# Patient Record
Sex: MALE | Race: WHITE | NOT HISPANIC OR LATINO | Employment: OTHER | URBAN - NONMETROPOLITAN AREA
[De-identification: names, ages, dates, MRNs, and addresses within clinical notes are randomized per-mention and may not be internally consistent; named-entity substitution may affect disease eponyms.]

---

## 2017-05-03 ENCOUNTER — OFFICE VISIT (OUTPATIENT)
Dept: UROLOGY | Facility: CLINIC | Age: 62
End: 2017-05-03

## 2017-05-03 VITALS
HEIGHT: 71 IN | BODY MASS INDEX: 28.42 KG/M2 | TEMPERATURE: 97.5 F | WEIGHT: 203 LBS | DIASTOLIC BLOOD PRESSURE: 64 MMHG | SYSTOLIC BLOOD PRESSURE: 112 MMHG

## 2017-05-03 DIAGNOSIS — R31.0 GROSS HEMATURIA: Primary | ICD-10-CM

## 2017-05-03 LAB
BILIRUB BLD-MCNC: NEGATIVE MG/DL
CLARITY, POC: CLEAR
COLOR UR: YELLOW
GLUCOSE UR STRIP-MCNC: NEGATIVE MG/DL
KETONES UR QL: NEGATIVE
LEUKOCYTE EST, POC: ABNORMAL
NITRITE UR-MCNC: NEGATIVE MG/ML
PH UR: 6.5 [PH] (ref 5–8)
PROT UR STRIP-MCNC: NEGATIVE MG/DL
RBC # UR STRIP: NEGATIVE /UL
SP GR UR: 1.02 (ref 1–1.03)
UROBILINOGEN UR QL: NORMAL

## 2017-05-03 PROCEDURE — 88112 CYTOPATH CELL ENHANCE TECH: CPT | Performed by: UROLOGY

## 2017-05-03 PROCEDURE — 81003 URINALYSIS AUTO W/O SCOPE: CPT | Performed by: UROLOGY

## 2017-05-03 PROCEDURE — 99204 OFFICE O/P NEW MOD 45 MIN: CPT | Performed by: UROLOGY

## 2017-05-03 RX ORDER — LANOLIN ALCOHOL/MO/W.PET/CERES
400 CREAM (GRAM) TOPICAL DAILY
COMMUNITY

## 2017-05-03 RX ORDER — OMEPRAZOLE 20 MG/1
20 CAPSULE, DELAYED RELEASE ORAL DAILY
COMMUNITY

## 2017-05-03 RX ORDER — ASPIRIN 81 MG/1
81 TABLET ORAL DAILY
COMMUNITY

## 2017-05-03 RX ORDER — CLOPIDOGREL BISULFATE 75 MG/1
75 TABLET ORAL DAILY
COMMUNITY
End: 2019-06-29 | Stop reason: HOSPADM

## 2017-05-03 RX ORDER — RANOLAZINE 500 MG/1
500 TABLET, EXTENDED RELEASE ORAL 2 TIMES DAILY
Status: ON HOLD | COMMUNITY
End: 2020-08-20 | Stop reason: SDUPTHER

## 2017-05-03 RX ORDER — UBIDECARENONE 100 MG
100 CAPSULE ORAL DAILY
COMMUNITY
End: 2019-06-29 | Stop reason: HOSPADM

## 2017-05-03 RX ORDER — UBIDECARENONE 75 MG
50 CAPSULE ORAL DAILY
COMMUNITY

## 2017-05-03 RX ORDER — ROSUVASTATIN CALCIUM 10 MG/1
10 TABLET, COATED ORAL DAILY
COMMUNITY

## 2017-05-08 LAB
CYTO UR: NORMAL
LAB AP CASE REPORT: NORMAL
Lab: NORMAL
PATH REPORT.FINAL DX SPEC: NORMAL
PATH REPORT.GROSS SPEC: NORMAL

## 2017-05-09 ENCOUNTER — HOSPITAL ENCOUNTER (OUTPATIENT)
Dept: CT IMAGING | Facility: HOSPITAL | Age: 62
Discharge: HOME | End: 2017-05-09
Attending: UROLOGY | Admitting: UROLOGY

## 2017-05-09 DIAGNOSIS — R31.0 GROSS HEMATURIA: ICD-10-CM

## 2017-05-09 LAB — CREAT BLDA-MCNC: 1.1 MG/DL (ref 0.6–1.3)

## 2017-05-09 PROCEDURE — 82565 ASSAY OF CREATININE: CPT

## 2017-05-09 PROCEDURE — 0 IOPAMIDOL PER 1 ML: Performed by: UROLOGY

## 2017-05-09 PROCEDURE — 74178 CT ABD&PLV WO CNTR FLWD CNTR: CPT

## 2017-05-09 RX ADMIN — IOPAMIDOL 150 ML: 755 INJECTION, SOLUTION INTRAVENOUS at 09:15

## 2017-05-11 ENCOUNTER — PROCEDURE VISIT (OUTPATIENT)
Dept: UROLOGY | Facility: CLINIC | Age: 62
End: 2017-05-11

## 2017-05-11 DIAGNOSIS — R31.0 GROSS HEMATURIA: Primary | ICD-10-CM

## 2017-05-11 DIAGNOSIS — N20.0 RENAL CALCULUS, LEFT: ICD-10-CM

## 2017-05-11 LAB
BILIRUB BLD-MCNC: NEGATIVE MG/DL
CLARITY, POC: CLEAR
COLOR UR: YELLOW
GLUCOSE UR STRIP-MCNC: NEGATIVE MG/DL
KETONES UR QL: NEGATIVE
LEUKOCYTE EST, POC: NEGATIVE
NITRITE UR-MCNC: NEGATIVE MG/ML
PH UR: 6 [PH] (ref 5–8)
PROT UR STRIP-MCNC: NEGATIVE MG/DL
RBC # UR STRIP: NEGATIVE /UL
SP GR UR: 1.03 (ref 1–1.03)
UROBILINOGEN UR QL: NORMAL

## 2017-05-11 PROCEDURE — 52000 CYSTOURETHROSCOPY: CPT | Performed by: UROLOGY

## 2017-05-11 PROCEDURE — 81003 URINALYSIS AUTO W/O SCOPE: CPT | Performed by: UROLOGY

## 2017-05-15 ENCOUNTER — APPOINTMENT (OUTPATIENT)
Dept: CT IMAGING | Facility: HOSPITAL | Age: 62
End: 2017-05-15
Attending: UROLOGY

## 2017-08-28 ENCOUNTER — HOSPITAL ENCOUNTER (EMERGENCY)
Facility: HOSPITAL | Age: 62
Discharge: HOME | End: 2017-08-29
Attending: EMERGENCY MEDICINE | Admitting: EMERGENCY MEDICINE

## 2017-08-28 ENCOUNTER — TRANSCRIBE ORDERS (OUTPATIENT)
Dept: LAB | Facility: HOSPITAL | Age: 62
End: 2017-08-28

## 2017-08-28 ENCOUNTER — LAB (OUTPATIENT)
Dept: LAB | Facility: HOSPITAL | Age: 62
End: 2017-08-28
Attending: INTERNAL MEDICINE

## 2017-08-28 ENCOUNTER — APPOINTMENT (OUTPATIENT)
Dept: NUCLEAR MEDICINE | Facility: HOSPITAL | Age: 62
End: 2017-08-28

## 2017-08-28 ENCOUNTER — APPOINTMENT (OUTPATIENT)
Dept: LAB | Facility: HOSPITAL | Age: 62
End: 2017-08-28

## 2017-08-28 ENCOUNTER — APPOINTMENT (OUTPATIENT)
Dept: GENERAL RADIOLOGY | Facility: HOSPITAL | Age: 62
End: 2017-08-28

## 2017-08-28 DIAGNOSIS — R77.8 TROPONIN I ABOVE REFERENCE RANGE: ICD-10-CM

## 2017-08-28 DIAGNOSIS — R79.89 OTHER ABNORMAL BLOOD CHEMISTRY: Primary | ICD-10-CM

## 2017-08-28 DIAGNOSIS — R07.9 CHEST PAIN, UNSPECIFIED TYPE: Primary | ICD-10-CM

## 2017-08-28 DIAGNOSIS — R77.8 TROPONIN I ABOVE REFERENCE RANGE: Primary | ICD-10-CM

## 2017-08-28 DIAGNOSIS — R79.89 D-DIMER, ELEVATED: ICD-10-CM

## 2017-08-28 LAB
ALBUMIN SERPL-MCNC: 4.5 G/DL (ref 3.4–4.8)
ALBUMIN/GLOB SERPL: 1.6 G/DL (ref 1.1–1.8)
ALP SERPL-CCNC: 68 U/L (ref 38–126)
ALT SERPL W P-5'-P-CCNC: 37 U/L (ref 21–72)
ANION GAP SERPL CALCULATED.3IONS-SCNC: 12 MMOL/L (ref 5–15)
AST SERPL-CCNC: 26 U/L (ref 17–59)
BASOPHILS # BLD AUTO: 0.04 10*3/MM3 (ref 0–0.2)
BASOPHILS NFR BLD AUTO: 0.7 % (ref 0–2)
BILIRUB SERPL-MCNC: 0.4 MG/DL (ref 0.2–1.3)
BUN BLD-MCNC: 14 MG/DL (ref 7–21)
BUN/CREAT SERPL: 11.9 (ref 7–25)
CALCIUM SPEC-SCNC: 9.2 MG/DL (ref 8.4–10.2)
CHLORIDE SERPL-SCNC: 101 MMOL/L (ref 95–110)
CK MB SERPL-CCNC: 1.07 NG/ML (ref 0–5)
CK SERPL-CCNC: 106 U/L (ref 55–170)
CO2 SERPL-SCNC: 26 MMOL/L (ref 22–31)
CREAT BLD-MCNC: 1.18 MG/DL (ref 0.7–1.3)
D-DIMER, QUANTITATIVE (MAD,POW, STR): 3176 NG/ML (FEU) (ref 0–470)
DEPRECATED RDW RBC AUTO: 40.4 FL (ref 35.1–43.9)
EOSINOPHIL # BLD AUTO: 0.22 10*3/MM3 (ref 0–0.7)
EOSINOPHIL NFR BLD AUTO: 3.6 % (ref 0–7)
ERYTHROCYTE [DISTWIDTH] IN BLOOD BY AUTOMATED COUNT: 12.1 % (ref 11.5–14.5)
GFR SERPL CREATININE-BSD FRML MDRD: 63 ML/MIN/1.73 (ref 49–113)
GLOBULIN UR ELPH-MCNC: 2.8 GM/DL (ref 2.3–3.5)
GLUCOSE BLD-MCNC: 86 MG/DL (ref 60–100)
HCT VFR BLD AUTO: 42.9 % (ref 39–49)
HGB BLD-MCNC: 14.7 G/DL (ref 13.7–17.3)
IMM GRANULOCYTES # BLD: 0.02 10*3/MM3 (ref 0–0.02)
IMM GRANULOCYTES NFR BLD: 0.3 % (ref 0–0.5)
LYMPHOCYTES # BLD AUTO: 1.9 10*3/MM3 (ref 0.6–4.2)
LYMPHOCYTES NFR BLD AUTO: 31.4 % (ref 10–50)
MCH RBC QN AUTO: 32.2 PG (ref 26.5–34)
MCHC RBC AUTO-ENTMCNC: 34.3 G/DL (ref 31.5–36.3)
MCV RBC AUTO: 94.1 FL (ref 80–98)
MONOCYTES # BLD AUTO: 0.69 10*3/MM3 (ref 0–0.9)
MONOCYTES NFR BLD AUTO: 11.4 % (ref 0–12)
NEUTROPHILS # BLD AUTO: 3.18 10*3/MM3 (ref 2–8.6)
NEUTROPHILS NFR BLD AUTO: 52.6 % (ref 37–80)
PLATELET # BLD AUTO: 205 10*3/MM3 (ref 150–450)
PMV BLD AUTO: 10.1 FL (ref 8–12)
POTASSIUM BLD-SCNC: 4 MMOL/L (ref 3.5–5.1)
PROT SERPL-MCNC: 7.3 G/DL (ref 6.3–8.6)
RBC # BLD AUTO: 4.56 10*6/MM3 (ref 4.37–5.74)
SODIUM BLD-SCNC: 139 MMOL/L (ref 137–145)
TROPONIN I SERPL-MCNC: <0.012 NG/ML
WBC NRBC COR # BLD: 6.05 10*3/MM3 (ref 3.2–9.8)

## 2017-08-28 PROCEDURE — 78582 LUNG VENTILAT&PERFUS IMAGING: CPT

## 2017-08-28 PROCEDURE — 84484 ASSAY OF TROPONIN QUANT: CPT

## 2017-08-28 PROCEDURE — 85025 COMPLETE CBC W/AUTO DIFF WBC: CPT

## 2017-08-28 PROCEDURE — A9539 TC99M PENTETATE: HCPCS | Performed by: EMERGENCY MEDICINE

## 2017-08-28 PROCEDURE — A9540 TC99M MAA: HCPCS | Performed by: EMERGENCY MEDICINE

## 2017-08-28 PROCEDURE — 85379 FIBRIN DEGRADATION QUANT: CPT

## 2017-08-28 PROCEDURE — 71020 HC CHEST PA AND LATERAL: CPT

## 2017-08-28 PROCEDURE — 80053 COMPREHEN METABOLIC PANEL: CPT

## 2017-08-28 PROCEDURE — 99283 EMERGENCY DEPT VISIT LOW MDM: CPT

## 2017-08-28 PROCEDURE — 0 TECHNETIUM TC 99M PENTETATE KIT: Performed by: EMERGENCY MEDICINE

## 2017-08-28 PROCEDURE — 82553 CREATINE MB FRACTION: CPT

## 2017-08-28 PROCEDURE — 36415 COLL VENOUS BLD VENIPUNCTURE: CPT

## 2017-08-28 PROCEDURE — 82550 ASSAY OF CK (CPK): CPT

## 2017-08-28 PROCEDURE — 0 TECHNETIUM ALBUMIN AGGREGATED: Performed by: EMERGENCY MEDICINE

## 2017-08-28 RX ADMIN — Medication 1 DOSE: at 23:44

## 2017-08-28 RX ADMIN — KIT FOR THE PREPARATION OF TECHNETIUM TC 99M PENTETATE 1 DOSE: 20 INJECTION, POWDER, LYOPHILIZED, FOR SOLUTION INTRAVENOUS; RESPIRATORY (INHALATION) at 23:23

## 2017-08-29 VITALS
WEIGHT: 192 LBS | TEMPERATURE: 98.8 F | BODY MASS INDEX: 26.88 KG/M2 | RESPIRATION RATE: 18 BRPM | HEART RATE: 60 BPM | DIASTOLIC BLOOD PRESSURE: 73 MMHG | OXYGEN SATURATION: 95 % | HEIGHT: 71 IN | SYSTOLIC BLOOD PRESSURE: 120 MMHG

## 2018-08-28 ENCOUNTER — LAB (OUTPATIENT)
Dept: LAB | Facility: HOSPITAL | Age: 63
End: 2018-08-28

## 2018-08-28 DIAGNOSIS — Z13.21 ENCOUNTER FOR VITAMIN DEFICIENCY SCREENING: Primary | ICD-10-CM

## 2018-08-28 DIAGNOSIS — Z13.21 ENCOUNTER FOR VITAMIN DEFICIENCY SCREENING: ICD-10-CM

## 2018-08-28 LAB — 25(OH)D3 SERPL-MCNC: 42.6 NG/ML (ref 30–100)

## 2018-08-28 PROCEDURE — 36415 COLL VENOUS BLD VENIPUNCTURE: CPT

## 2018-08-28 PROCEDURE — 82306 VITAMIN D 25 HYDROXY: CPT

## 2019-06-27 ENCOUNTER — APPOINTMENT (OUTPATIENT)
Dept: GENERAL RADIOLOGY | Facility: HOSPITAL | Age: 64
End: 2019-06-27

## 2019-06-27 ENCOUNTER — HOSPITAL ENCOUNTER (OUTPATIENT)
Facility: HOSPITAL | Age: 64
Discharge: HOME | End: 2019-06-29
Attending: INTERNAL MEDICINE | Admitting: INTERNAL MEDICINE

## 2019-06-27 ENCOUNTER — APPOINTMENT (OUTPATIENT)
Dept: CARDIOLOGY | Facility: HOSPITAL | Age: 64
End: 2019-06-27

## 2019-06-27 DIAGNOSIS — I25.720 CORONARY ARTERY DISEASE INVOLVING AUTOLOGOUS ARTERY CORONARY BYPASS GRAFT WITH UNSTABLE ANGINA PECTORIS (HCC): Primary | ICD-10-CM

## 2019-06-27 PROBLEM — R07.9 CHEST PAIN: Status: ACTIVE | Noted: 2019-06-27

## 2019-06-27 LAB
ALBUMIN SERPL-MCNC: 3.9 G/DL (ref 3.5–5.2)
ALBUMIN/GLOB SERPL: 1.4 G/DL
ALP SERPL-CCNC: 76 U/L (ref 39–117)
ALT SERPL W P-5'-P-CCNC: 21 U/L (ref 1–41)
ANION GAP SERPL CALCULATED.3IONS-SCNC: 10 MMOL/L (ref 5–15)
AST SERPL-CCNC: 16 U/L (ref 1–40)
BASOPHILS # BLD AUTO: 0.06 10*3/MM3 (ref 0–0.2)
BASOPHILS NFR BLD AUTO: 0.9 % (ref 0–1.5)
BH CV ECHO MEAS - ACS: 1.8 CM
BH CV ECHO MEAS - AO MAX PG (FULL): 2.9 MMHG
BH CV ECHO MEAS - AO MAX PG: 6.9 MMHG
BH CV ECHO MEAS - AO MEAN PG (FULL): 2 MMHG
BH CV ECHO MEAS - AO MEAN PG: 4 MMHG
BH CV ECHO MEAS - AO ROOT AREA (BSA CORRECTED): 1.4
BH CV ECHO MEAS - AO ROOT AREA: 7.1 CM^2
BH CV ECHO MEAS - AO ROOT DIAM: 3 CM
BH CV ECHO MEAS - AO V2 MAX: 131 CM/SEC
BH CV ECHO MEAS - AO V2 MEAN: 94 CM/SEC
BH CV ECHO MEAS - AO V2 VTI: 27 CM
BH CV ECHO MEAS - ASC AORTA: 3 CM
BH CV ECHO MEAS - AVA(I,A): 2.5 CM^2
BH CV ECHO MEAS - AVA(I,D): 2.5 CM^2
BH CV ECHO MEAS - AVA(V,A): 2.4 CM^2
BH CV ECHO MEAS - AVA(V,D): 2.4 CM^2
BH CV ECHO MEAS - BSA(HAYCOCK): 2.1 M^2
BH CV ECHO MEAS - BSA: 2.1 M^2
BH CV ECHO MEAS - BZI_BMI: 30.1 KILOGRAMS/M^2
BH CV ECHO MEAS - BZI_METRIC_HEIGHT: 175.3 CM
BH CV ECHO MEAS - BZI_METRIC_WEIGHT: 92.5 KG
BH CV ECHO MEAS - EDV(CUBED): 96.7 ML
BH CV ECHO MEAS - EDV(TEICH): 96.8 ML
BH CV ECHO MEAS - EF(CUBED): 67.4 %
BH CV ECHO MEAS - EF(TEICH): 59 %
BH CV ECHO MEAS - ESV(CUBED): 31.6 ML
BH CV ECHO MEAS - ESV(TEICH): 39.7 ML
BH CV ECHO MEAS - FS: 31.2 %
BH CV ECHO MEAS - IVS/LVPW: 1.1
BH CV ECHO MEAS - IVSD: 0.81 CM
BH CV ECHO MEAS - LA DIMENSION: 4.6 CM
BH CV ECHO MEAS - LA/AO: 1.5
BH CV ECHO MEAS - LV MASS(C)D: 114 GRAMS
BH CV ECHO MEAS - LV MASS(C)DI: 54.7 GRAMS/M^2
BH CV ECHO MEAS - LV MAX PG: 4 MMHG
BH CV ECHO MEAS - LV MEAN PG: 2 MMHG
BH CV ECHO MEAS - LV V1 MAX: 99.4 CM/SEC
BH CV ECHO MEAS - LV V1 MEAN: 67.1 CM/SEC
BH CV ECHO MEAS - LV V1 VTI: 21.5 CM
BH CV ECHO MEAS - LVIDD: 4.6 CM
BH CV ECHO MEAS - LVIDS: 3.2 CM
BH CV ECHO MEAS - LVOT AREA (M): 3.1 CM^2
BH CV ECHO MEAS - LVOT AREA: 3.1 CM^2
BH CV ECHO MEAS - LVOT DIAM: 2 CM
BH CV ECHO MEAS - LVPWD: 0.76 CM
BH CV ECHO MEAS - MR MAX PG: 39.7 MMHG
BH CV ECHO MEAS - MR MAX VEL: 315 CM/SEC
BH CV ECHO MEAS - MV A MAX VEL: 56.8 CM/SEC
BH CV ECHO MEAS - MV DEC SLOPE: 328 CM/SEC^2
BH CV ECHO MEAS - MV E MAX VEL: 89.2 CM/SEC
BH CV ECHO MEAS - MV E/A: 1.6
BH CV ECHO MEAS - MV P1/2T MAX VEL: 93.6 CM/SEC
BH CV ECHO MEAS - MV P1/2T: 83.6 MSEC
BH CV ECHO MEAS - MVA P1/2T LCG: 2.4 CM^2
BH CV ECHO MEAS - MVA(P1/2T): 2.6 CM^2
BH CV ECHO MEAS - PA MAX PG (FULL): 7.7 MMHG
BH CV ECHO MEAS - PA MAX PG: 9.6 MMHG
BH CV ECHO MEAS - PA V2 MAX: 155 CM/SEC
BH CV ECHO MEAS - PI END-D VEL: 96.4 CM/SEC
BH CV ECHO MEAS - RAP SYSTOLE: 5 MMHG
BH CV ECHO MEAS - RV MAX PG: 1.9 MMHG
BH CV ECHO MEAS - RV MEAN PG: 1 MMHG
BH CV ECHO MEAS - RV V1 MAX: 69.5 CM/SEC
BH CV ECHO MEAS - RV V1 MEAN: 50.1 CM/SEC
BH CV ECHO MEAS - RV V1 VTI: 17.8 CM
BH CV ECHO MEAS - RVDD: 2.8 CM
BH CV ECHO MEAS - RVSP: 37.9 MMHG
BH CV ECHO MEAS - SI(AO): 91.6 ML/M^2
BH CV ECHO MEAS - SI(CUBED): 31.3 ML/M^2
BH CV ECHO MEAS - SI(LVOT): 32.4 ML/M^2
BH CV ECHO MEAS - SI(TEICH): 27.4 ML/M^2
BH CV ECHO MEAS - SV(AO): 190.9 ML
BH CV ECHO MEAS - SV(CUBED): 65.1 ML
BH CV ECHO MEAS - SV(LVOT): 67.5 ML
BH CV ECHO MEAS - SV(TEICH): 57.1 ML
BH CV ECHO MEAS - TR MAX VEL: 287 CM/SEC
BILIRUB SERPL-MCNC: 0.4 MG/DL (ref 0.2–1.2)
BUN BLD-MCNC: 10 MG/DL (ref 8–23)
BUN/CREAT SERPL: 9.2 (ref 7–25)
CALCIUM SPEC-SCNC: 9 MG/DL (ref 8.6–10.5)
CHLORIDE SERPL-SCNC: 100 MMOL/L (ref 98–107)
CHOLEST SERPL-MCNC: 162 MG/DL (ref 0–200)
CO2 SERPL-SCNC: 28 MMOL/L (ref 22–29)
CREAT BLD-MCNC: 1.09 MG/DL (ref 0.76–1.27)
DEPRECATED RDW RBC AUTO: 39.1 FL (ref 37–54)
EOSINOPHIL # BLD AUTO: 0.23 10*3/MM3 (ref 0–0.4)
EOSINOPHIL NFR BLD AUTO: 3.5 % (ref 0.3–6.2)
ERYTHROCYTE [DISTWIDTH] IN BLOOD BY AUTOMATED COUNT: 11.7 % (ref 12.3–15.4)
GFR SERPL CREATININE-BSD FRML MDRD: 68 ML/MIN/1.73
GLOBULIN UR ELPH-MCNC: 2.8 GM/DL
GLUCOSE BLD-MCNC: 97 MG/DL (ref 65–99)
HCT VFR BLD AUTO: 43.5 % (ref 37.5–51)
HDLC SERPL-MCNC: 33 MG/DL (ref 40–60)
HGB BLD-MCNC: 15.2 G/DL (ref 13–17.7)
IMM GRANULOCYTES # BLD AUTO: 0.02 10*3/MM3 (ref 0–0.05)
IMM GRANULOCYTES NFR BLD AUTO: 0.3 % (ref 0–0.5)
INR PPP: 1 (ref 0.8–1.2)
LDLC SERPL CALC-MCNC: 87 MG/DL (ref 0–100)
LDLC/HDLC SERPL: 2.64 {RATIO}
LV EF 2D ECHO EST: 58 %
LYMPHOCYTES # BLD AUTO: 2.16 10*3/MM3 (ref 0.7–3.1)
LYMPHOCYTES NFR BLD AUTO: 32.7 % (ref 19.6–45.3)
MAGNESIUM SERPL-MCNC: 2.2 MG/DL (ref 1.6–2.4)
MCH RBC QN AUTO: 31.9 PG (ref 26.6–33)
MCHC RBC AUTO-ENTMCNC: 34.9 G/DL (ref 31.5–35.7)
MCV RBC AUTO: 91.4 FL (ref 79–97)
MONOCYTES # BLD AUTO: 0.9 10*3/MM3 (ref 0.1–0.9)
MONOCYTES NFR BLD AUTO: 13.6 % (ref 5–12)
NEUTROPHILS # BLD AUTO: 3.24 10*3/MM3 (ref 1.7–7)
NEUTROPHILS NFR BLD AUTO: 49 % (ref 42.7–76)
NRBC BLD AUTO-RTO: 0 /100 WBC (ref 0–0.2)
PLATELET # BLD AUTO: 199 10*3/MM3 (ref 140–450)
PMV BLD AUTO: 9.7 FL (ref 6–12)
POTASSIUM BLD-SCNC: 4.1 MMOL/L (ref 3.5–5.2)
PROT SERPL-MCNC: 6.7 G/DL (ref 6–8.5)
PROTHROMBIN TIME: 13 SECONDS (ref 11.1–15.3)
RBC # BLD AUTO: 4.76 10*6/MM3 (ref 4.14–5.8)
SODIUM BLD-SCNC: 138 MMOL/L (ref 136–145)
TRIGL SERPL-MCNC: 209 MG/DL (ref 0–150)
TROPONIN T SERPL-MCNC: <0.01 NG/ML (ref 0–0.03)
VLDLC SERPL-MCNC: 41.8 MG/DL
WBC NRBC COR # BLD: 6.61 10*3/MM3 (ref 3.4–10.8)

## 2019-06-27 PROCEDURE — 85025 COMPLETE CBC W/AUTO DIFF WBC: CPT | Performed by: INTERNAL MEDICINE

## 2019-06-27 PROCEDURE — G0378 HOSPITAL OBSERVATION PER HR: HCPCS

## 2019-06-27 PROCEDURE — 85610 PROTHROMBIN TIME: CPT | Performed by: INTERNAL MEDICINE

## 2019-06-27 PROCEDURE — 25010000002 ENOXAPARIN PER 10 MG: Performed by: INTERNAL MEDICINE

## 2019-06-27 PROCEDURE — 83735 ASSAY OF MAGNESIUM: CPT | Performed by: INTERNAL MEDICINE

## 2019-06-27 PROCEDURE — 80061 LIPID PANEL: CPT | Performed by: INTERNAL MEDICINE

## 2019-06-27 PROCEDURE — 96372 THER/PROPH/DIAG INJ SC/IM: CPT

## 2019-06-27 PROCEDURE — 93010 ELECTROCARDIOGRAM REPORT: CPT | Performed by: INTERNAL MEDICINE

## 2019-06-27 PROCEDURE — 96361 HYDRATE IV INFUSION ADD-ON: CPT

## 2019-06-27 PROCEDURE — 71046 X-RAY EXAM CHEST 2 VIEWS: CPT

## 2019-06-27 PROCEDURE — 82306 VITAMIN D 25 HYDROXY: CPT | Performed by: INTERNAL MEDICINE

## 2019-06-27 PROCEDURE — 80053 COMPREHEN METABOLIC PANEL: CPT | Performed by: INTERNAL MEDICINE

## 2019-06-27 PROCEDURE — G0379 DIRECT REFER HOSPITAL OBSERV: HCPCS

## 2019-06-27 PROCEDURE — 93306 TTE W/DOPPLER COMPLETE: CPT | Performed by: INTERNAL MEDICINE

## 2019-06-27 PROCEDURE — 63710000001 DIPHENHYDRAMINE PER 50 MG: Performed by: INTERNAL MEDICINE

## 2019-06-27 PROCEDURE — 93005 ELECTROCARDIOGRAM TRACING: CPT | Performed by: INTERNAL MEDICINE

## 2019-06-27 PROCEDURE — 93306 TTE W/DOPPLER COMPLETE: CPT

## 2019-06-27 PROCEDURE — 84484 ASSAY OF TROPONIN QUANT: CPT | Performed by: INTERNAL MEDICINE

## 2019-06-27 PROCEDURE — 63710000001 PREDNISONE PER 5 MG: Performed by: INTERNAL MEDICINE

## 2019-06-27 PROCEDURE — 96360 HYDRATION IV INFUSION INIT: CPT

## 2019-06-27 RX ORDER — LEVOTHYROXINE SODIUM 0.03 MG/1
25 TABLET ORAL DAILY
COMMUNITY

## 2019-06-27 RX ORDER — SODIUM CHLORIDE 0.9 % (FLUSH) 0.9 %
3-10 SYRINGE (ML) INJECTION AS NEEDED
Status: DISCONTINUED | OUTPATIENT
Start: 2019-06-27 | End: 2019-06-29 | Stop reason: HOSPADM

## 2019-06-27 RX ORDER — DIPHENHYDRAMINE HCL 25 MG
25 CAPSULE ORAL ONCE
Status: COMPLETED | OUTPATIENT
Start: 2019-06-27 | End: 2019-06-27

## 2019-06-27 RX ORDER — FOLIC ACID 1 MG/1
500 TABLET ORAL DAILY
Status: DISCONTINUED | OUTPATIENT
Start: 2019-06-28 | End: 2019-06-29 | Stop reason: HOSPADM

## 2019-06-27 RX ORDER — ROSUVASTATIN CALCIUM 10 MG/1
10 TABLET, COATED ORAL NIGHTLY
Status: DISCONTINUED | OUTPATIENT
Start: 2019-06-27 | End: 2019-06-29 | Stop reason: HOSPADM

## 2019-06-27 RX ORDER — ALUMINA, MAGNESIA, AND SIMETHICONE 2400; 2400; 240 MG/30ML; MG/30ML; MG/30ML
15 SUSPENSION ORAL EVERY 6 HOURS PRN
Status: DISCONTINUED | OUTPATIENT
Start: 2019-06-27 | End: 2019-06-29 | Stop reason: HOSPADM

## 2019-06-27 RX ORDER — ASPIRIN 81 MG/1
81 TABLET ORAL DAILY
Status: DISCONTINUED | OUTPATIENT
Start: 2019-06-28 | End: 2019-06-29 | Stop reason: HOSPADM

## 2019-06-27 RX ORDER — PREDNISONE 10 MG/1
60 TABLET ORAL ONCE
Status: COMPLETED | OUTPATIENT
Start: 2019-06-27 | End: 2019-06-27

## 2019-06-27 RX ORDER — RANOLAZINE 500 MG/1
500 TABLET, EXTENDED RELEASE ORAL EVERY 12 HOURS SCHEDULED
Status: DISCONTINUED | OUTPATIENT
Start: 2019-06-27 | End: 2019-06-29 | Stop reason: HOSPADM

## 2019-06-27 RX ORDER — ONDANSETRON 2 MG/ML
4 INJECTION INTRAMUSCULAR; INTRAVENOUS EVERY 6 HOURS PRN
Status: DISCONTINUED | OUTPATIENT
Start: 2019-06-27 | End: 2019-06-29 | Stop reason: HOSPADM

## 2019-06-27 RX ORDER — SODIUM CHLORIDE 9 MG/ML
100 INJECTION, SOLUTION INTRAVENOUS CONTINUOUS
Status: DISCONTINUED | OUTPATIENT
Start: 2019-06-27 | End: 2019-06-28

## 2019-06-27 RX ORDER — CLOPIDOGREL BISULFATE 75 MG/1
75 TABLET ORAL DAILY
Status: DISCONTINUED | OUTPATIENT
Start: 2019-06-28 | End: 2019-06-28 | Stop reason: HOSPADM

## 2019-06-27 RX ORDER — LEVOTHYROXINE SODIUM 0.03 MG/1
25 TABLET ORAL
Status: DISCONTINUED | OUTPATIENT
Start: 2019-06-28 | End: 2019-06-29 | Stop reason: HOSPADM

## 2019-06-27 RX ORDER — ACETAMINOPHEN 325 MG/1
650 TABLET ORAL EVERY 6 HOURS PRN
Status: DISCONTINUED | OUTPATIENT
Start: 2019-06-27 | End: 2019-06-29 | Stop reason: HOSPADM

## 2019-06-27 RX ORDER — SODIUM CHLORIDE 0.9 % (FLUSH) 0.9 %
3 SYRINGE (ML) INJECTION EVERY 12 HOURS SCHEDULED
Status: DISCONTINUED | OUTPATIENT
Start: 2019-06-27 | End: 2019-06-29 | Stop reason: HOSPADM

## 2019-06-27 RX ORDER — UBIDECARENONE 75 MG
50 CAPSULE ORAL DAILY
Status: DISCONTINUED | OUTPATIENT
Start: 2019-06-28 | End: 2019-06-29 | Stop reason: HOSPADM

## 2019-06-27 RX ORDER — BISACODYL 5 MG/1
5 TABLET, DELAYED RELEASE ORAL DAILY PRN
Status: DISCONTINUED | OUTPATIENT
Start: 2019-06-27 | End: 2019-06-29 | Stop reason: HOSPADM

## 2019-06-27 RX ADMIN — METOPROLOL TARTRATE 25 MG: 25 TABLET ORAL at 20:47

## 2019-06-27 RX ADMIN — DIPHENHYDRAMINE HYDROCHLORIDE 25 MG: 25 CAPSULE ORAL at 20:56

## 2019-06-27 RX ADMIN — ROSUVASTATIN CALCIUM 10 MG: 10 TABLET, FILM COATED ORAL at 20:47

## 2019-06-27 RX ADMIN — ENOXAPARIN SODIUM 40 MG: 40 INJECTION SUBCUTANEOUS at 14:23

## 2019-06-27 RX ADMIN — SODIUM CHLORIDE 100 ML/HR: 9 INJECTION, SOLUTION INTRAVENOUS at 12:44

## 2019-06-27 RX ADMIN — RANOLAZINE 500 MG: 500 TABLET, FILM COATED, EXTENDED RELEASE ORAL at 20:46

## 2019-06-27 RX ADMIN — SODIUM CHLORIDE, PRESERVATIVE FREE 3 ML: 5 INJECTION INTRAVENOUS at 12:44

## 2019-06-27 RX ADMIN — SODIUM CHLORIDE 100 ML/HR: 9 INJECTION, SOLUTION INTRAVENOUS at 21:52

## 2019-06-27 RX ADMIN — PREDNISONE 60 MG: 10 TABLET ORAL at 20:51

## 2019-06-28 ENCOUNTER — APPOINTMENT (OUTPATIENT)
Dept: CT IMAGING | Facility: HOSPITAL | Age: 64
End: 2019-06-28

## 2019-06-28 PROBLEM — I25.720 CORONARY ARTERY DISEASE INVOLVING AUTOLOGOUS ARTERY CORONARY BYPASS GRAFT WITH UNSTABLE ANGINA PECTORIS (HCC): Status: ACTIVE | Noted: 2019-06-27

## 2019-06-28 LAB
25(OH)D3 SERPL-MCNC: 23.4 NG/ML (ref 30–100)
ANION GAP SERPL CALCULATED.3IONS-SCNC: 10 MMOL/L (ref 5–15)
BASOPHILS # BLD AUTO: 0.01 10*3/MM3 (ref 0–0.2)
BASOPHILS NFR BLD AUTO: 0.2 % (ref 0–1.5)
BUN BLD-MCNC: 10 MG/DL (ref 8–23)
BUN/CREAT SERPL: 9 (ref 7–25)
CALCIUM SPEC-SCNC: 9 MG/DL (ref 8.6–10.5)
CHLORIDE SERPL-SCNC: 104 MMOL/L (ref 98–107)
CO2 SERPL-SCNC: 25 MMOL/L (ref 22–29)
CREAT BLD-MCNC: 1.11 MG/DL (ref 0.76–1.27)
DEPRECATED RDW RBC AUTO: 39.1 FL (ref 37–54)
EOSINOPHIL # BLD AUTO: 0 10*3/MM3 (ref 0–0.4)
EOSINOPHIL NFR BLD AUTO: 0 % (ref 0.3–6.2)
ERYTHROCYTE [DISTWIDTH] IN BLOOD BY AUTOMATED COUNT: 11.5 % (ref 12.3–15.4)
GFR SERPL CREATININE-BSD FRML MDRD: 67 ML/MIN/1.73
GLUCOSE BLD-MCNC: 157 MG/DL (ref 65–99)
HCT VFR BLD AUTO: 42.4 % (ref 37.5–51)
HCT VFR BLD AUTO: 44 % (ref 37.5–51)
HGB BLD-MCNC: 14.7 G/DL (ref 13–17.7)
HGB BLD-MCNC: 15.4 G/DL (ref 13–17.7)
IMM GRANULOCYTES # BLD AUTO: 0.03 10*3/MM3 (ref 0–0.05)
IMM GRANULOCYTES NFR BLD AUTO: 0.5 % (ref 0–0.5)
INR PPP: 1.01 (ref 0.8–1.2)
LYMPHOCYTES # BLD AUTO: 0.96 10*3/MM3 (ref 0.7–3.1)
LYMPHOCYTES NFR BLD AUTO: 15.3 % (ref 19.6–45.3)
MCH RBC QN AUTO: 32.4 PG (ref 26.6–33)
MCHC RBC AUTO-ENTMCNC: 35 G/DL (ref 31.5–35.7)
MCV RBC AUTO: 92.6 FL (ref 79–97)
MONOCYTES # BLD AUTO: 0.1 10*3/MM3 (ref 0.1–0.9)
MONOCYTES NFR BLD AUTO: 1.6 % (ref 5–12)
NEUTROPHILS # BLD AUTO: 5.19 10*3/MM3 (ref 1.7–7)
NEUTROPHILS NFR BLD AUTO: 82.4 % (ref 42.7–76)
NRBC BLD AUTO-RTO: 0 /100 WBC (ref 0–0.2)
PLATELET # BLD AUTO: 217 10*3/MM3 (ref 140–450)
PMV BLD AUTO: 9.9 FL (ref 6–12)
POTASSIUM BLD-SCNC: 4.4 MMOL/L (ref 3.5–5.2)
PROTHROMBIN TIME: 13.1 SECONDS (ref 11.1–15.3)
RBC # BLD AUTO: 4.75 10*6/MM3 (ref 4.14–5.8)
SODIUM BLD-SCNC: 139 MMOL/L (ref 136–145)
WBC NRBC COR # BLD: 6.29 10*3/MM3 (ref 3.4–10.8)

## 2019-06-28 PROCEDURE — C1757 CATH, THROMBECTOMY/EMBOLECT: HCPCS | Performed by: INTERNAL MEDICINE

## 2019-06-28 PROCEDURE — 85025 COMPLETE CBC W/AUTO DIFF WBC: CPT | Performed by: INTERNAL MEDICINE

## 2019-06-28 PROCEDURE — 80048 BASIC METABOLIC PNL TOTAL CA: CPT | Performed by: INTERNAL MEDICINE

## 2019-06-28 PROCEDURE — C1887 CATHETER, GUIDING: HCPCS | Performed by: INTERNAL MEDICINE

## 2019-06-28 PROCEDURE — G0378 HOSPITAL OBSERVATION PER HR: HCPCS

## 2019-06-28 PROCEDURE — 25010000002 FENTANYL CITRATE (PF) 100 MCG/2ML SOLUTION: Performed by: INTERNAL MEDICINE

## 2019-06-28 PROCEDURE — 93010 ELECTROCARDIOGRAM REPORT: CPT | Performed by: INTERNAL MEDICINE

## 2019-06-28 PROCEDURE — 25010000002 HYDROCORTISONE SODIUM SUCCINATE 100 MG RECONSTITUTED SOLUTION: Performed by: INTERNAL MEDICINE

## 2019-06-28 PROCEDURE — 85018 HEMOGLOBIN: CPT | Performed by: INTERNAL MEDICINE

## 2019-06-28 PROCEDURE — C1760 CLOSURE DEV, VASC: HCPCS | Performed by: INTERNAL MEDICINE

## 2019-06-28 PROCEDURE — C9604 PERC D-E COR REVASC T CABG S: HCPCS | Performed by: INTERNAL MEDICINE

## 2019-06-28 PROCEDURE — C1725 CATH, TRANSLUMIN NON-LASER: HCPCS | Performed by: INTERNAL MEDICINE

## 2019-06-28 PROCEDURE — 85014 HEMATOCRIT: CPT | Performed by: INTERNAL MEDICINE

## 2019-06-28 PROCEDURE — 85610 PROTHROMBIN TIME: CPT | Performed by: INTERNAL MEDICINE

## 2019-06-28 PROCEDURE — 25010000002 EPTIFIBATIDE PER 5 MG: Performed by: INTERNAL MEDICINE

## 2019-06-28 PROCEDURE — 96361 HYDRATE IV INFUSION ADD-ON: CPT

## 2019-06-28 PROCEDURE — C1874 STENT, COATED/COV W/DEL SYS: HCPCS | Performed by: INTERNAL MEDICINE

## 2019-06-28 PROCEDURE — C1769 GUIDE WIRE: HCPCS | Performed by: INTERNAL MEDICINE

## 2019-06-28 PROCEDURE — 25010000002 BIVALIRUDIN TRIFLUOROACETATE 250 MG RECONSTITUTED SOLUTION 1 EACH VIAL: Performed by: INTERNAL MEDICINE

## 2019-06-28 PROCEDURE — 25010000002 EPTIFIBATIDE 20 MG/10ML SOLUTION: Performed by: INTERNAL MEDICINE

## 2019-06-28 PROCEDURE — C1894 INTRO/SHEATH, NON-LASER: HCPCS | Performed by: INTERNAL MEDICINE

## 2019-06-28 PROCEDURE — 0 IOPAMIDOL PER 1 ML: Performed by: INTERNAL MEDICINE

## 2019-06-28 PROCEDURE — 93455 CORONARY ART/GRFT ANGIO S&I: CPT | Performed by: INTERNAL MEDICINE

## 2019-06-28 PROCEDURE — 25010000002 MIDAZOLAM PER 1 MG: Performed by: INTERNAL MEDICINE

## 2019-06-28 PROCEDURE — 25010000002 DIPHENHYDRAMINE PER 50 MG: Performed by: INTERNAL MEDICINE

## 2019-06-28 PROCEDURE — 93005 ELECTROCARDIOGRAM TRACING: CPT | Performed by: INTERNAL MEDICINE

## 2019-06-28 DEVICE — XIENCE SIERRA™ EVEROLIMUS ELUTING CORONARY STENT SYSTEM 2.25 MM X 12 MM / RAPID-EXCHANGE
Type: IMPLANTABLE DEVICE | Status: FUNCTIONAL
Brand: XIENCE SIERRA™

## 2019-06-28 DEVICE — XIENCE SIERRA™ EVEROLIMUS ELUTING CORONARY STENT SYSTEM 2.75 MM X 38 MM / RAPID-EXCHANGE
Type: IMPLANTABLE DEVICE | Status: FUNCTIONAL
Brand: XIENCE SIERRA™

## 2019-06-28 DEVICE — XIENCE SIERRA™ EVEROLIMUS ELUTING CORONARY STENT SYSTEM 2.75 MM X 33 MM / RAPID-EXCHANGE
Type: IMPLANTABLE DEVICE | Status: FUNCTIONAL
Brand: XIENCE SIERRA™

## 2019-06-28 RX ORDER — SODIUM CHLORIDE 0.9 % (FLUSH) 0.9 %
3 SYRINGE (ML) INJECTION EVERY 12 HOURS SCHEDULED
Status: DISCONTINUED | OUTPATIENT
Start: 2019-06-28 | End: 2019-06-28 | Stop reason: HOSPADM

## 2019-06-28 RX ORDER — EPTIFIBATIDE 0.75 MG/ML
2 INJECTION, SOLUTION INTRAVENOUS CONTINUOUS
Status: DISCONTINUED | OUTPATIENT
Start: 2019-06-28 | End: 2019-06-29 | Stop reason: HOSPADM

## 2019-06-28 RX ORDER — SODIUM CHLORIDE 0.9 % (FLUSH) 0.9 %
1-10 SYRINGE (ML) INJECTION AS NEEDED
Status: DISCONTINUED | OUTPATIENT
Start: 2019-06-28 | End: 2019-06-28 | Stop reason: HOSPADM

## 2019-06-28 RX ORDER — NITROGLYCERIN 5 MG/ML
INJECTION, SOLUTION INTRAVENOUS AS NEEDED
Status: DISCONTINUED | OUTPATIENT
Start: 2019-06-28 | End: 2019-06-28 | Stop reason: HOSPADM

## 2019-06-28 RX ORDER — EPTIFIBATIDE 0.75 MG/ML
INJECTION, SOLUTION INTRAVENOUS CONTINUOUS PRN
Status: COMPLETED | OUTPATIENT
Start: 2019-06-28 | End: 2019-06-28

## 2019-06-28 RX ORDER — DIPHENHYDRAMINE HYDROCHLORIDE 50 MG/ML
25 INJECTION INTRAMUSCULAR; INTRAVENOUS ONCE
Status: COMPLETED | OUTPATIENT
Start: 2019-06-28 | End: 2019-06-28

## 2019-06-28 RX ORDER — LIDOCAINE HYDROCHLORIDE 20 MG/ML
INJECTION, SOLUTION INFILTRATION; PERINEURAL AS NEEDED
Status: DISCONTINUED | OUTPATIENT
Start: 2019-06-28 | End: 2019-06-28 | Stop reason: HOSPADM

## 2019-06-28 RX ORDER — SODIUM CHLORIDE 9 MG/ML
1-3 INJECTION, SOLUTION INTRAVENOUS CONTINUOUS
Status: DISCONTINUED | OUTPATIENT
Start: 2019-06-28 | End: 2019-06-28

## 2019-06-28 RX ORDER — EPTIFIBATIDE 2 MG/ML
INJECTION, SOLUTION INTRAVENOUS AS NEEDED
Status: DISCONTINUED | OUTPATIENT
Start: 2019-06-28 | End: 2019-06-28 | Stop reason: HOSPADM

## 2019-06-28 RX ORDER — FENTANYL CITRATE 50 UG/ML
INJECTION, SOLUTION INTRAMUSCULAR; INTRAVENOUS AS NEEDED
Status: DISCONTINUED | OUTPATIENT
Start: 2019-06-28 | End: 2019-06-28 | Stop reason: HOSPADM

## 2019-06-28 RX ORDER — SODIUM CHLORIDE 9 MG/ML
100 INJECTION, SOLUTION INTRAVENOUS CONTINUOUS
Status: DISCONTINUED | OUTPATIENT
Start: 2019-06-28 | End: 2019-06-29 | Stop reason: HOSPADM

## 2019-06-28 RX ORDER — MIDAZOLAM HYDROCHLORIDE 1 MG/ML
INJECTION INTRAMUSCULAR; INTRAVENOUS AS NEEDED
Status: DISCONTINUED | OUTPATIENT
Start: 2019-06-28 | End: 2019-06-28 | Stop reason: HOSPADM

## 2019-06-28 RX ADMIN — ROSUVASTATIN CALCIUM 10 MG: 10 TABLET, FILM COATED ORAL at 20:38

## 2019-06-28 RX ADMIN — METOPROLOL TARTRATE 25 MG: 25 TABLET ORAL at 16:33

## 2019-06-28 RX ADMIN — TICAGRELOR 90 MG: 90 TABLET ORAL at 20:39

## 2019-06-28 RX ADMIN — HYDROCORTISONE SODIUM SUCCINATE 100 MG: 100 INJECTION, POWDER, FOR SOLUTION INTRAMUSCULAR; INTRAVENOUS at 09:49

## 2019-06-28 RX ADMIN — METOPROLOL TARTRATE 25 MG: 25 TABLET ORAL at 20:38

## 2019-06-28 RX ADMIN — Medication 3 ML: at 16:35

## 2019-06-28 RX ADMIN — SODIUM CHLORIDE 3 ML/KG/HR: 9 INJECTION, SOLUTION INTRAVENOUS at 07:14

## 2019-06-28 RX ADMIN — RANOLAZINE 500 MG: 500 TABLET, FILM COATED, EXTENDED RELEASE ORAL at 20:38

## 2019-06-28 RX ADMIN — Medication 500 MCG: at 16:34

## 2019-06-28 RX ADMIN — DIPHENHYDRAMINE HYDROCHLORIDE 25 MG: 50 INJECTION INTRAMUSCULAR; INTRAVENOUS at 09:48

## 2019-06-28 RX ADMIN — VITAM B12 50 MCG: 100 TAB at 16:33

## 2019-06-28 RX ADMIN — RANOLAZINE 500 MG: 500 TABLET, FILM COATED, EXTENDED RELEASE ORAL at 16:33

## 2019-06-28 RX ADMIN — SODIUM CHLORIDE 100 ML/HR: 9 INJECTION, SOLUTION INTRAVENOUS at 23:05

## 2019-06-29 ENCOUNTER — APPOINTMENT (OUTPATIENT)
Dept: ULTRASOUND IMAGING | Facility: HOSPITAL | Age: 64
End: 2019-06-29

## 2019-06-29 VITALS
BODY MASS INDEX: 30.24 KG/M2 | HEIGHT: 69 IN | OXYGEN SATURATION: 98 % | HEART RATE: 68 BPM | RESPIRATION RATE: 18 BRPM | DIASTOLIC BLOOD PRESSURE: 70 MMHG | TEMPERATURE: 98 F | WEIGHT: 204.19 LBS | SYSTOLIC BLOOD PRESSURE: 128 MMHG

## 2019-06-29 LAB
ANION GAP SERPL CALCULATED.3IONS-SCNC: 11 MMOL/L (ref 5–15)
BASOPHILS # BLD AUTO: 0.05 10*3/MM3 (ref 0–0.2)
BASOPHILS NFR BLD AUTO: 0.4 % (ref 0–1.5)
BUN BLD-MCNC: 7 MG/DL (ref 8–23)
BUN/CREAT SERPL: 7.5 (ref 7–25)
CALCIUM SPEC-SCNC: 8.4 MG/DL (ref 8.6–10.5)
CHLORIDE SERPL-SCNC: 103 MMOL/L (ref 98–107)
CO2 SERPL-SCNC: 26 MMOL/L (ref 22–29)
CREAT BLD-MCNC: 0.93 MG/DL (ref 0.76–1.27)
DEPRECATED RDW RBC AUTO: 40.8 FL (ref 37–54)
EOSINOPHIL # BLD AUTO: 0.02 10*3/MM3 (ref 0–0.4)
EOSINOPHIL NFR BLD AUTO: 0.2 % (ref 0.3–6.2)
ERYTHROCYTE [DISTWIDTH] IN BLOOD BY AUTOMATED COUNT: 11.9 % (ref 12.3–15.4)
GFR SERPL CREATININE-BSD FRML MDRD: 82 ML/MIN/1.73
GLUCOSE BLD-MCNC: 129 MG/DL (ref 65–99)
HCT VFR BLD AUTO: 42.1 % (ref 37.5–51)
HGB BLD-MCNC: 14.4 G/DL (ref 13–17.7)
IMM GRANULOCYTES # BLD AUTO: 0.05 10*3/MM3 (ref 0–0.05)
IMM GRANULOCYTES NFR BLD AUTO: 0.4 % (ref 0–0.5)
LYMPHOCYTES # BLD AUTO: 1.89 10*3/MM3 (ref 0.7–3.1)
LYMPHOCYTES NFR BLD AUTO: 16.7 % (ref 19.6–45.3)
MCH RBC QN AUTO: 31.9 PG (ref 26.6–33)
MCHC RBC AUTO-ENTMCNC: 34.2 G/DL (ref 31.5–35.7)
MCV RBC AUTO: 93.3 FL (ref 79–97)
MONOCYTES # BLD AUTO: 1.18 10*3/MM3 (ref 0.1–0.9)
MONOCYTES NFR BLD AUTO: 10.4 % (ref 5–12)
NEUTROPHILS # BLD AUTO: 8.11 10*3/MM3 (ref 1.7–7)
NEUTROPHILS NFR BLD AUTO: 71.9 % (ref 42.7–76)
NRBC BLD AUTO-RTO: 0 /100 WBC (ref 0–0.2)
PLATELET # BLD AUTO: 186 10*3/MM3 (ref 140–450)
PMV BLD AUTO: 10.3 FL (ref 6–12)
POTASSIUM BLD-SCNC: 3.6 MMOL/L (ref 3.5–5.2)
RBC # BLD AUTO: 4.51 10*6/MM3 (ref 4.14–5.8)
SODIUM BLD-SCNC: 140 MMOL/L (ref 136–145)
WBC NRBC COR # BLD: 11.3 10*3/MM3 (ref 3.4–10.8)

## 2019-06-29 PROCEDURE — G0378 HOSPITAL OBSERVATION PER HR: HCPCS

## 2019-06-29 PROCEDURE — 93926 LOWER EXTREMITY STUDY: CPT

## 2019-06-29 PROCEDURE — 85025 COMPLETE CBC W/AUTO DIFF WBC: CPT | Performed by: INTERNAL MEDICINE

## 2019-06-29 PROCEDURE — 80048 BASIC METABOLIC PNL TOTAL CA: CPT | Performed by: INTERNAL MEDICINE

## 2019-06-29 RX ADMIN — TICAGRELOR 90 MG: 90 TABLET ORAL at 08:08

## 2019-06-29 RX ADMIN — METOPROLOL TARTRATE 25 MG: 25 TABLET ORAL at 08:11

## 2019-06-29 RX ADMIN — VITAM B12 50 MCG: 100 TAB at 08:08

## 2019-06-29 RX ADMIN — RANOLAZINE 500 MG: 500 TABLET, FILM COATED, EXTENDED RELEASE ORAL at 08:08

## 2019-06-29 RX ADMIN — Medication 500 MCG: at 08:08

## 2019-06-29 RX ADMIN — LEVOTHYROXINE SODIUM 25 MCG: 25 TABLET ORAL at 04:49

## 2019-06-29 RX ADMIN — SODIUM CHLORIDE, PRESERVATIVE FREE 3 ML: 5 INJECTION INTRAVENOUS at 08:09

## 2019-06-29 RX ADMIN — ASPIRIN 81 MG: 81 TABLET, COATED ORAL at 08:08

## 2019-08-08 ENCOUNTER — PREP FOR SURGERY (OUTPATIENT)
Dept: OTHER | Facility: HOSPITAL | Age: 64
End: 2019-08-08

## 2019-08-08 DIAGNOSIS — R07.9 CHEST PAIN, UNSPECIFIED TYPE: Primary | ICD-10-CM

## 2019-08-08 DIAGNOSIS — I25.710 ATHEROSCLEROSIS OF AUTOLOGOUS VEIN CORONARY ARTERY BYPASS GRAFT WITH UNSTABLE ANGINA PECTORIS (HCC): ICD-10-CM

## 2019-08-08 DIAGNOSIS — I25.110 ATHEROSCLEROSIS OF NATIVE CORONARY ARTERY OF NATIVE HEART WITH UNSTABLE ANGINA PECTORIS (HCC): ICD-10-CM

## 2019-08-08 RX ORDER — SODIUM CHLORIDE 0.9 % (FLUSH) 0.9 %
3 SYRINGE (ML) INJECTION EVERY 12 HOURS SCHEDULED
Status: CANCELLED | OUTPATIENT
Start: 2019-08-12

## 2019-08-08 RX ORDER — SODIUM CHLORIDE 0.9 % (FLUSH) 0.9 %
1-10 SYRINGE (ML) INJECTION AS NEEDED
Status: CANCELLED | OUTPATIENT
Start: 2019-08-12

## 2019-08-08 RX ORDER — SODIUM CHLORIDE 9 MG/ML
75 INJECTION, SOLUTION INTRAVENOUS CONTINUOUS
Status: CANCELLED | OUTPATIENT
Start: 2019-08-12

## 2019-08-12 ENCOUNTER — HOSPITAL ENCOUNTER (OUTPATIENT)
Facility: HOSPITAL | Age: 64
Discharge: HOME | End: 2019-08-13
Attending: INTERNAL MEDICINE | Admitting: INTERNAL MEDICINE

## 2019-08-12 DIAGNOSIS — I25.710 ATHEROSCLEROSIS OF AUTOLOGOUS VEIN CORONARY ARTERY BYPASS GRAFT WITH UNSTABLE ANGINA PECTORIS (HCC): ICD-10-CM

## 2019-08-12 DIAGNOSIS — I25.110 ATHEROSCLEROSIS OF NATIVE CORONARY ARTERY OF NATIVE HEART WITH UNSTABLE ANGINA PECTORIS (HCC): ICD-10-CM

## 2019-08-12 DIAGNOSIS — R07.9 CHEST PAIN, UNSPECIFIED TYPE: ICD-10-CM

## 2019-08-12 LAB
ANION GAP SERPL CALCULATED.3IONS-SCNC: 11 MMOL/L (ref 5–15)
BASOPHILS # BLD AUTO: 0.06 10*3/MM3 (ref 0–0.2)
BASOPHILS NFR BLD AUTO: 1 % (ref 0–1.5)
BUN BLD-MCNC: 7 MG/DL (ref 8–23)
BUN/CREAT SERPL: 7.3 (ref 7–25)
CALCIUM SPEC-SCNC: 9.1 MG/DL (ref 8.6–10.5)
CHLORIDE SERPL-SCNC: 104 MMOL/L (ref 98–107)
CHOLEST SERPL-MCNC: 140 MG/DL (ref 0–200)
CO2 SERPL-SCNC: 25 MMOL/L (ref 22–29)
CREAT BLD-MCNC: 0.96 MG/DL (ref 0.76–1.27)
DEPRECATED RDW RBC AUTO: 39.9 FL (ref 37–54)
EOSINOPHIL # BLD AUTO: 0.26 10*3/MM3 (ref 0–0.4)
EOSINOPHIL NFR BLD AUTO: 4.2 % (ref 0.3–6.2)
ERYTHROCYTE [DISTWIDTH] IN BLOOD BY AUTOMATED COUNT: 12 % (ref 12.3–15.4)
GFR SERPL CREATININE-BSD FRML MDRD: 79 ML/MIN/1.73
GLUCOSE BLD-MCNC: 109 MG/DL (ref 65–99)
HCT VFR BLD AUTO: 43.5 % (ref 37.5–51)
HDLC SERPL-MCNC: 36 MG/DL (ref 40–60)
HGB BLD-MCNC: 14.9 G/DL (ref 13–17.7)
IMM GRANULOCYTES # BLD AUTO: 0.03 10*3/MM3 (ref 0–0.05)
IMM GRANULOCYTES NFR BLD AUTO: 0.5 % (ref 0–0.5)
INR PPP: 0.97 (ref 0.8–1.2)
LDLC SERPL CALC-MCNC: 80 MG/DL (ref 0–100)
LDLC/HDLC SERPL: 2.22 {RATIO}
LYMPHOCYTES # BLD AUTO: 1.79 10*3/MM3 (ref 0.7–3.1)
LYMPHOCYTES NFR BLD AUTO: 28.7 % (ref 19.6–45.3)
MCH RBC QN AUTO: 31.4 PG (ref 26.6–33)
MCHC RBC AUTO-ENTMCNC: 34.3 G/DL (ref 31.5–35.7)
MCV RBC AUTO: 91.6 FL (ref 79–97)
MONOCYTES # BLD AUTO: 0.74 10*3/MM3 (ref 0.1–0.9)
MONOCYTES NFR BLD AUTO: 11.9 % (ref 5–12)
NEUTROPHILS # BLD AUTO: 3.35 10*3/MM3 (ref 1.7–7)
NEUTROPHILS NFR BLD AUTO: 53.7 % (ref 42.7–76)
NRBC BLD AUTO-RTO: 0 /100 WBC (ref 0–0.2)
PLATELET # BLD AUTO: 222 10*3/MM3 (ref 140–450)
PMV BLD AUTO: 9.9 FL (ref 6–12)
POTASSIUM BLD-SCNC: 4.2 MMOL/L (ref 3.5–5.2)
PROTHROMBIN TIME: 12.7 SECONDS (ref 11.1–15.3)
RBC # BLD AUTO: 4.75 10*6/MM3 (ref 4.14–5.8)
SODIUM BLD-SCNC: 140 MMOL/L (ref 136–145)
TRIGL SERPL-MCNC: 121 MG/DL (ref 0–150)
VLDLC SERPL-MCNC: 24.2 MG/DL
WBC NRBC COR # BLD: 6.23 10*3/MM3 (ref 3.4–10.8)

## 2019-08-12 PROCEDURE — C1894 INTRO/SHEATH, NON-LASER: HCPCS | Performed by: INTERNAL MEDICINE

## 2019-08-12 PROCEDURE — 93455 CORONARY ART/GRFT ANGIO S&I: CPT | Performed by: INTERNAL MEDICINE

## 2019-08-12 PROCEDURE — C1887 CATHETER, GUIDING: HCPCS | Performed by: INTERNAL MEDICINE

## 2019-08-12 PROCEDURE — C1725 CATH, TRANSLUMIN NON-LASER: HCPCS | Performed by: INTERNAL MEDICINE

## 2019-08-12 PROCEDURE — 85025 COMPLETE CBC W/AUTO DIFF WBC: CPT | Performed by: INTERNAL MEDICINE

## 2019-08-12 PROCEDURE — 80061 LIPID PANEL: CPT | Performed by: INTERNAL MEDICINE

## 2019-08-12 PROCEDURE — 92937 PRQ TRLUML REVSC CAB GRF 1: CPT | Performed by: INTERNAL MEDICINE

## 2019-08-12 PROCEDURE — C1760 CLOSURE DEV, VASC: HCPCS | Performed by: INTERNAL MEDICINE

## 2019-08-12 PROCEDURE — 80048 BASIC METABOLIC PNL TOTAL CA: CPT | Performed by: INTERNAL MEDICINE

## 2019-08-12 PROCEDURE — 0 IOPAMIDOL PER 1 ML: Performed by: INTERNAL MEDICINE

## 2019-08-12 PROCEDURE — 25010000002 DIPHENHYDRAMINE PER 50 MG: Performed by: INTERNAL MEDICINE

## 2019-08-12 PROCEDURE — C1769 GUIDE WIRE: HCPCS | Performed by: INTERNAL MEDICINE

## 2019-08-12 PROCEDURE — 85610 PROTHROMBIN TIME: CPT | Performed by: INTERNAL MEDICINE

## 2019-08-12 PROCEDURE — 25010000002 MIDAZOLAM PER 1 MG: Performed by: INTERNAL MEDICINE

## 2019-08-12 PROCEDURE — 25010000002 HYDROCORTISONE SODIUM SUCCINATE 100 MG RECONSTITUTED SOLUTION: Performed by: INTERNAL MEDICINE

## 2019-08-12 PROCEDURE — 25010000002 BIVALIRUDIN TRIFLUOROACETATE 250 MG RECONSTITUTED SOLUTION: Performed by: INTERNAL MEDICINE

## 2019-08-12 PROCEDURE — 25010000002 BIVALIRUDIN TRIFLUOROACETATE 250 MG RECONSTITUTED SOLUTION 1 EACH VIAL: Performed by: INTERNAL MEDICINE

## 2019-08-12 PROCEDURE — 25010000002 FENTANYL CITRATE (PF) 100 MCG/2ML SOLUTION: Performed by: INTERNAL MEDICINE

## 2019-08-12 PROCEDURE — C1725 CATH, TRANSLUMIN NON-LASER: HCPCS

## 2019-08-12 RX ORDER — MIDAZOLAM HYDROCHLORIDE 1 MG/ML
INJECTION INTRAMUSCULAR; INTRAVENOUS AS NEEDED
Status: DISCONTINUED | OUTPATIENT
Start: 2019-08-12 | End: 2019-08-12 | Stop reason: HOSPADM

## 2019-08-12 RX ORDER — SODIUM CHLORIDE 9 MG/ML
75 INJECTION, SOLUTION INTRAVENOUS CONTINUOUS
Status: DISCONTINUED | OUTPATIENT
Start: 2019-08-12 | End: 2019-08-13

## 2019-08-12 RX ORDER — ASPIRIN 81 MG/1
81 TABLET ORAL DAILY
Status: DISCONTINUED | OUTPATIENT
Start: 2019-08-12 | End: 2019-08-13 | Stop reason: HOSPADM

## 2019-08-12 RX ORDER — SODIUM CHLORIDE 0.9 % (FLUSH) 0.9 %
3 SYRINGE (ML) INJECTION EVERY 12 HOURS SCHEDULED
Status: DISCONTINUED | OUTPATIENT
Start: 2019-08-12 | End: 2019-08-12 | Stop reason: HOSPADM

## 2019-08-12 RX ORDER — UBIDECARENONE 75 MG
50 CAPSULE ORAL DAILY
Status: DISCONTINUED | OUTPATIENT
Start: 2019-08-13 | End: 2019-08-13 | Stop reason: HOSPADM

## 2019-08-12 RX ORDER — SODIUM CHLORIDE 0.9 % (FLUSH) 0.9 %
1-10 SYRINGE (ML) INJECTION AS NEEDED
Status: DISCONTINUED | OUTPATIENT
Start: 2019-08-12 | End: 2019-08-12 | Stop reason: HOSPADM

## 2019-08-12 RX ORDER — LIDOCAINE HYDROCHLORIDE 20 MG/ML
INJECTION, SOLUTION INFILTRATION; PERINEURAL AS NEEDED
Status: DISCONTINUED | OUTPATIENT
Start: 2019-08-12 | End: 2019-08-12 | Stop reason: HOSPADM

## 2019-08-12 RX ORDER — DIPHENHYDRAMINE HYDROCHLORIDE 50 MG/ML
INJECTION INTRAMUSCULAR; INTRAVENOUS AS NEEDED
Status: DISCONTINUED | OUTPATIENT
Start: 2019-08-12 | End: 2019-08-12 | Stop reason: HOSPADM

## 2019-08-12 RX ORDER — FENTANYL CITRATE 50 UG/ML
INJECTION, SOLUTION INTRAMUSCULAR; INTRAVENOUS AS NEEDED
Status: DISCONTINUED | OUTPATIENT
Start: 2019-08-12 | End: 2019-08-12 | Stop reason: HOSPADM

## 2019-08-12 RX ORDER — RANOLAZINE 500 MG/1
500 TABLET, EXTENDED RELEASE ORAL EVERY 12 HOURS SCHEDULED
Status: DISCONTINUED | OUTPATIENT
Start: 2019-08-12 | End: 2019-08-13 | Stop reason: HOSPADM

## 2019-08-12 RX ORDER — SODIUM CHLORIDE 9 MG/ML
100 INJECTION, SOLUTION INTRAVENOUS CONTINUOUS
Status: DISCONTINUED | OUTPATIENT
Start: 2019-08-12 | End: 2019-08-13

## 2019-08-12 RX ORDER — ROSUVASTATIN CALCIUM 10 MG/1
10 TABLET, COATED ORAL NIGHTLY
Status: DISCONTINUED | OUTPATIENT
Start: 2019-08-12 | End: 2019-08-13 | Stop reason: HOSPADM

## 2019-08-12 RX ORDER — BIVALIRUDIN 250 MG/5ML
INJECTION, POWDER, LYOPHILIZED, FOR SOLUTION INTRAVENOUS AS NEEDED
Status: DISCONTINUED | OUTPATIENT
Start: 2019-08-12 | End: 2019-08-12 | Stop reason: HOSPADM

## 2019-08-12 RX ORDER — LEVOTHYROXINE SODIUM 0.03 MG/1
25 TABLET ORAL DAILY
Status: DISCONTINUED | OUTPATIENT
Start: 2019-08-13 | End: 2019-08-13 | Stop reason: HOSPADM

## 2019-08-12 RX ADMIN — RANOLAZINE 500 MG: 500 TABLET, FILM COATED, EXTENDED RELEASE ORAL at 21:17

## 2019-08-12 RX ADMIN — Medication 1 ML: at 09:43

## 2019-08-12 RX ADMIN — ROSUVASTATIN CALCIUM 10 MG: 10 TABLET, FILM COATED ORAL at 21:17

## 2019-08-12 RX ADMIN — HYDROCORTISONE SODIUM SUCCINATE 100 MG: 100 INJECTION, POWDER, FOR SOLUTION INTRAMUSCULAR; INTRAVENOUS at 14:09

## 2019-08-12 RX ADMIN — SODIUM CHLORIDE 75 ML/HR: 9 INJECTION, SOLUTION INTRAVENOUS at 07:53

## 2019-08-12 RX ADMIN — TICAGRELOR 90 MG: 90 TABLET ORAL at 21:17

## 2019-08-12 RX ADMIN — SODIUM CHLORIDE 75 ML/HR: 9 INJECTION, SOLUTION INTRAVENOUS at 12:24

## 2019-08-12 NOTE — PLAN OF CARE
Problem: Patient Care Overview  Goal: Plan of Care Review   08/12/19 9521   Coping/Psychosocial   Plan of Care Reviewed With patient;spouse   Plan of Care Review   Progress improving   OTHER   Outcome Summary Patient has done well since arrival from Cath lab. Right groin site Perclose device is soft and non tender to touch. Bed rest x 4 hours per verbal order from Dr Barnard. VSS.

## 2019-08-12 NOTE — H&P
Cardiology History and Physical Note        Patient Name: Lawrence Danielson  Age/Sex: 64 y.o. male  : 1955  MRN: [id number]    Date of Admission : 2019    Primary care Physician: Angel Kapadia MD    Reason for Admission: Chest pain unstable angina pectoris history of atherosclerotic coronary artery disease with previous coronary artery bypass grafting and PTCA and stenting of the obtuse marginal branch left heart catheterization    Subjective:       Chief Complaint: Chest pain      History of Present Illness:  Lawrence Danielson is a 64 y.o. male     Body mass index is 29.23 kg/m². With a past medical history significant for atherosclerotic coronary artery disease with the first coronary angiogram done in  which had revealed 100% occlusion of the distal circumflex artery with critical stenosis in the left anterior descending artery and right coronary artery with subsequent coronary artery bypass grafting with a LIMA to the LAD, saphenous vein graft to the obtuse marginal branch, saphenous vein graft to the first diagonal branch,  coronary angiogram done in 2015 which had revealed a patent saphenous vein graft to the diagonal branch with a nonfunctioning left internal mammary artery, 99% stenosis in the saphenous vein graft to the obtuse marginal branch, PTCA and stenting of the saphenous vein graft to the obtuse marginal branch with deployment of a 2.5 mm x 23 mm and a 2.5 mm x 12 mm expedition stent, last coronary angiogram done in 2019 which had revealed nonfunctioning left internal mammary artery with nonobstructive disease in the left anterior descending artery and no evidence of any obstructive disease noted in the right coronary artery with a saphenous vein graft to the obtuse marginal branch with sluggish flow with 95 to 99% stenosis and a saphenous vein graft to the diagonal branch with 2 sequential 80 to 90% stenosis with successful Pronto thrombectomy of the saphenous vein  graft to the obtuse marginal branch and PTCA and stenting of the saphenous vein graft to the obtuse marginal branch with deployment of a 2.75 mm x 38 mm Xience Judy drug-eluting stent x2 and a 2.75 mm x 33 mm Xience Judy drug-eluting stent and a 2.25 mm x 12 mm Xience Judy drug-eluting stent, with the diagonal branch stenosis which was treated medically, arterial hypertension, hypertensive heart disease, hyperlipidemia, and strong family history for coronary artery disease.    Patient had done reasonably well since the last coronary intervention.  Patient has had a few episodes of chest pain and was treated medically.    Patient now presents with recurrent symptoms of chest pain which has been increasing in frequency and severity.  Patient describes the chest pain is similar in quality as to one which he had prior to his stent placement.  Patient on further questioning denies any fever chill patient denies any hemoptysis hematuria bright red blood per rectum.    Patient 10 point review of system except for what stated in the history of present illness and negative.          Past Medical History:  1.   Chest pain.  2.   Previous history of of a non-Q myocardial infarction.  3.   Atherosclerotic coronary artery disease.  4.   Coronary artery bypass grafting with:       a.   LIMA to the LAD.       b.   Saphenous vein graft to the obtuse marginal branch.       c.   Saphenous vein graft to the diagonal branch.  5.   Last coronary angiogram done in  June 2019 which had revealed:  A.  Nonfunctioning left internal mammary artery with nonobstructive disease in the left anterior descending artery.  B .  No evidence of any obstructive disease noted in the right coronary artery.  C.  Saphenous vein graft to the obtuse marginal branch with sluggish flow with sustained patency in the proximal stent with 95 to 99% stenosis.  D.  Saphenous vein graft to the diagonal branch with 2 sequential stenosis of up to 80 to 90%.  E.   Nonobstructive disease in the native left anterior descending artery and circumflex artery  F.  Pronto thrombectomy of the saphenous vein graft to the obtuse marginal branch.  G.  PTCA and stenting of the saphenous vein graft to the obtuse marginal branch with deployment of 2.75 mm x 38 mm Xience Judy drug-eluting stent x2, 2.75 mm x 33 mm Xience Judy drug-eluting stent and a 2.25 mm x 12 mm Xience Judy drug-eluting stent  6.  Coronary angiogram done in 2015 had revealed:       a.   Nonfunctioning left internal mammary artery with  nonobstructive disease in the left anterior descending artery.       b.   Critical stenosis in the diagonal branch with a patent saphenous vein graft to the diagonal branch.       c.   A 100% occlusion of the distal circumflex artery with a  saphenous vein graft to the obtuse marginal branch with 99% stenosis status post Pronto thrombectomy of the saphenous vein graft to the obtuse marginal branch with deployment of a 2.5 mm x 23 mm and a 2.5 mm x 12 mm expedition stent.       d.   No evidence of any obstructive disease noted in the right coronary artery.  7.   Arterial hypertension with hypertensive heart disease.  8.   Concentric left ventricular hypertrophy with diastolic dysfunction.  9.   Mild mitral, mild tricuspid regurgitation.  10.  Hyperlipidemia.  11.  Transient ischemic attack.  12.  Nonerosive gastritis.  13.  Gastrointestinal bleeding with subsequent stopping of the antiplatelet therapy Plavix.  14.  Strong family history for coronary artery disease.  15.  Hypothyroidism.     PAST SURGICAL HISTORY:  1.   Coronary artery bypass grafting done in 2003 with:       a.   LIMA to the LAD.       b.   Saphenous vein graft to the obtuse marginal branch.       c.   Saphenous vein graft to the diagonal branch.  2.   Left inguinal herniorrhaphy.  3.   Esophagogastroduodenoscopy.  4.  Pronto thrombectomy of the saphenous vein graft to the obtuse marginal branch and stenting with  deployment of a 2.5 mm x 23 mm and a 2.5 mm x 12 mm expedition stent 2015.  5.  Pronto thrombectomy of the saphenous vein graft to the obtuse marginal branch June 2019.  6.  PTCA and stenting of the saphenous vein graft to the obtuse marginal branch with deployment of 2.75 mm x 38 mm Xience Judy drug-eluting stent x2, 2.75 mm x 33 mm Xience Judy drug-eluting stent and a 2.25 mm x 12 mm Xience Judy drug-eluting stent June 2019.      SOCIAL HISTORY:  Previous history of tobacco abuse. Social alcohol intake. Patient is self-employed.     FAMILY HISTORY:  Significant for father who had premature atherosclerotic coronary artery disease.     CARDIAC RISK FACTORS:  1.   Male.  2.   Arterial hypertension.  3.   Hyperlipidemia.  4.   Family history for coronary artery disease.  5.   Previous history of tobacco abuse.         Allergies:  Allergies   Allergen Reactions   • Contrast Dye Anaphylaxis   • Shellfish-Derived Products Anaphylaxis       Home Medication::  Prior to Admission medications    Medication Sig Start Date End Date Taking? Authorizing Provider   aspirin 81 MG EC tablet Take 81 mg by mouth Daily.   Yes Long Cope MD   folic acid (FOLVITE) 400 MCG tablet Take 400 mcg by mouth Daily.   Yes Long Cope MD   levothyroxine (SYNTHROID, LEVOTHROID) 25 MCG tablet Take 25 mcg by mouth Daily.   Yes Long Cope MD   metoprolol tartrate (LOPRESSOR) 25 MG tablet Take 25 mg by mouth 2 (Two) Times a Day.   Yes Long Cope MD   omeprazole (priLOSEC) 20 MG capsule Take 20 mg by mouth Daily.   Yes Long Cope MD   ranolazine (RANEXA) 500 MG 12 hr tablet Take 500 mg by mouth 2 (Two) Times a Day.   Yes Long Cope MD   rosuvastatin (CRESTOR) 10 MG tablet Take 10 mg by mouth Daily.   Yes Long Cope MD   ticagrelor (BRILINTA) 90 MG tablet tablet Take 1 tablet by mouth 2 (Two) Times a Day. 6/29/19  Yes Everton Bach MD   vitamin B-12 (CYANOCOBALAMIN)  100 MCG tablet Take 50 mcg by mouth Daily.   Yes Provider, MD Long         Review of Systems   Constitutional: Negative for chills, fever and unexpected weight change.   HENT: Negative for hearing loss and nosebleeds.    Eyes: Negative for visual disturbance.   Respiratory: Positive for chest tightness and shortness of breath. Negative for cough and wheezing.    Cardiovascular: Positive for chest pain. Negative for palpitations and leg swelling.   Gastrointestinal: Negative for abdominal pain, blood in stool, constipation, diarrhea, nausea and vomiting.   Endocrine: Negative for cold intolerance, heat intolerance, polydipsia, polyphagia and polyuria.   Genitourinary: Negative for hematuria.   Musculoskeletal: Negative for joint swelling, myalgias and neck pain.   Skin: Negative for color change, rash and wound.   Neurological: Negative for dizziness, seizures, syncope, light-headedness, numbness and headaches.   Hematological: Does not bruise/bleed easily.         Objective:     Objective:  Temp:  [96.9 °F (36.1 °C)] 96.9 °F (36.1 °C)  Heart Rate:  [70] 70  Resp:  [18] 18  BP: (133)/(69) 133/69      Body mass index is 29.23 kg/m².       Physical Exam   Constitutional: He is oriented to person, place, and time. He appears well-developed and well-nourished.   HENT:   Head: Normocephalic and atraumatic.   Left Ear: External ear normal.   Nose: Nose normal.   Mouth/Throat: Oropharynx is clear and moist.   Eyes: Conjunctivae and EOM are normal. Pupils are equal, round, and reactive to light. No scleral icterus.   Neck: Normal range of motion. Neck supple. No JVD present. No tracheal deviation present. No thyromegaly present.   Cardiovascular: Normal rate and regular rhythm.   Pulmonary/Chest: Breath sounds normal. No stridor.   Abdominal: Bowel sounds are normal.   Musculoskeletal: Normal range of motion.   Lymphadenopathy:     He has no cervical adenopathy.   Neurological: He is alert and oriented to person,  place, and time. He has normal reflexes.   Skin: Skin is warm and dry.   Psychiatric: He has a normal mood and affect. His behavior is normal. Judgment and thought content normal.         Lab Review:     Results from last 7 days   Lab Units 08/12/19  0750   SODIUM mmol/L 140   POTASSIUM mmol/L 4.2   CHLORIDE mmol/L 104   CO2 mmol/L 25.0   BUN mg/dL 7*   CREATININE mg/dL 0.96   CALCIUM mg/dL 9.1   GLUCOSE mg/dL 109*             Results from last 7 days   Lab Units 08/12/19  0750   WBC 10*3/mm3 6.23   HEMOGLOBIN g/dL 14.9   HEMATOCRIT % 43.5   PLATELETS 10*3/mm3 222     Results from last 7 days   Lab Units 08/12/19  0750   INR  0.97                       EKG:   ECG/EMG Results (last 24 hours)     ** No results found for the last 24 hours. **          Imaging:  Imaging Results (last 24 hours)     ** No results found for the last 24 hours. **          I personally viewed and interpreted the patient's EKG/Telemetry data.    Assessment:   1.  Chest pain.  2.  Atherosclerotic coronary artery disease.  3.  Previous coronary artery bypass grafting.  4.  Arterial hypertension.              Plan:   1.  Chest pain.  Patient has history of documented atherosclerotic coronary artery disease with previous coronary artery bypass grafting done in 2003.  Patient last coronary angiogram was done in 2015 with a nonfunctioning left internal mammary artery and a saphenous vein graft to the obtuse marginal branch which had 99% stenosis.  Patient underwent successful PTCA and stenting and Pronto thrombectomy of the saphenous vein graft to the obtuse marginal branch.  Patient now has symptoms of chest pain which is similar in quality as the one which he had prior to the stent placement.  Patient had recurrent symptoms of chest pain and underwent coronary angiogram in June 2019 with Pronto thrombectomy and PTCA and stenting of the saphenous vein graft to the obtuse marginal branch with 80 to 90% stenosis in the saphenous vein graft to the  diagonal branch which was treated medically.  Patient now has recurrent symptoms of chest pain suggestive of angina.  Patient was recommended coronary angiogram.  Patient has documented history of atherosclerotic coronary artery disease with symptoms of substernal chest pain suggestive of crescendo unstable angina pectoris.  Patient has been explained risk-benefit treatment option for a coronary angiogram and an informed consent was obtained from the patient for the coronary angiogram.  Patient Mallampati score was II patient ASA classification was II.  Patient would undergo IV hydration and will check the renal function prior to the coronary angiogram.  2.  Arterial hypertension.  Patient blood pressure is currently well controlled and patient will be continued on the present dose of the metoprolol.  3.  Hypertensive heart disease.  Clinically at the present time patient not in congestive heart failure.  4.  Hyperlipidemia.  Patient has been recommended to continue with the present dose of the Crestor.  5.  Hypothyroidism.  Patient is currently on thyroid supplement.  Patient is currently on thyroid supplement.  6.  Gastroesophageal reflux disease.  Patient is currently on Prilosec.     Above plan of management were discussed with the patient.        Time: time spent in face-to-face evaluation of greater than 55 minutes and interacting and formulating examining and discussing the plan with the patient with 50% of greater time spent in face-to-face interaction.    Patricio Barnard MD  08/12/19  9:25 AM      Dictated utilizing Dragon dictation.

## 2019-08-13 VITALS
SYSTOLIC BLOOD PRESSURE: 124 MMHG | BODY MASS INDEX: 28.46 KG/M2 | HEART RATE: 65 BPM | DIASTOLIC BLOOD PRESSURE: 74 MMHG | OXYGEN SATURATION: 97 % | RESPIRATION RATE: 16 BRPM | TEMPERATURE: 98 F | WEIGHT: 198.8 LBS | HEIGHT: 70 IN

## 2019-08-13 LAB
ANION GAP SERPL CALCULATED.3IONS-SCNC: 11 MMOL/L (ref 5–15)
BUN BLD-MCNC: 10 MG/DL (ref 8–23)
BUN/CREAT SERPL: 10 (ref 7–25)
CALCIUM SPEC-SCNC: 9.1 MG/DL (ref 8.6–10.5)
CHLORIDE SERPL-SCNC: 104 MMOL/L (ref 98–107)
CO2 SERPL-SCNC: 27 MMOL/L (ref 22–29)
CREAT BLD-MCNC: 1 MG/DL (ref 0.76–1.27)
DEPRECATED RDW RBC AUTO: 41.4 FL (ref 37–54)
ERYTHROCYTE [DISTWIDTH] IN BLOOD BY AUTOMATED COUNT: 12.2 % (ref 12.3–15.4)
GFR SERPL CREATININE-BSD FRML MDRD: 75 ML/MIN/1.73
GLUCOSE BLD-MCNC: 107 MG/DL (ref 65–99)
HCT VFR BLD AUTO: 43.2 % (ref 37.5–51)
HGB BLD-MCNC: 14.9 G/DL (ref 13–17.7)
MCH RBC QN AUTO: 32.1 PG (ref 26.6–33)
MCHC RBC AUTO-ENTMCNC: 34.5 G/DL (ref 31.5–35.7)
MCV RBC AUTO: 93.1 FL (ref 79–97)
PLATELET # BLD AUTO: 228 10*3/MM3 (ref 140–450)
PMV BLD AUTO: 10 FL (ref 6–12)
POTASSIUM BLD-SCNC: 3.6 MMOL/L (ref 3.5–5.2)
RBC # BLD AUTO: 4.64 10*6/MM3 (ref 4.14–5.8)
SODIUM BLD-SCNC: 142 MMOL/L (ref 136–145)
WBC NRBC COR # BLD: 10.24 10*3/MM3 (ref 3.4–10.8)

## 2019-08-13 PROCEDURE — 85027 COMPLETE CBC AUTOMATED: CPT | Performed by: INTERNAL MEDICINE

## 2019-08-13 PROCEDURE — 80048 BASIC METABOLIC PNL TOTAL CA: CPT | Performed by: INTERNAL MEDICINE

## 2019-08-13 RX ADMIN — LEVOTHYROXINE SODIUM 25 MCG: 25 TABLET ORAL at 06:34

## 2019-08-13 RX ADMIN — VITAM B12 50 MCG: 100 TAB at 08:04

## 2019-08-13 RX ADMIN — ASPIRIN 81 MG: 81 TABLET, COATED ORAL at 08:05

## 2019-08-13 RX ADMIN — TICAGRELOR 90 MG: 90 TABLET ORAL at 08:05

## 2019-08-13 RX ADMIN — RANOLAZINE 500 MG: 500 TABLET, FILM COATED, EXTENDED RELEASE ORAL at 08:04

## 2019-08-13 RX ADMIN — METOPROLOL TARTRATE 25 MG: 25 TABLET ORAL at 08:04

## 2019-08-13 NOTE — DISCHARGE SUMMARY
Date of Discharge:  8/13/2019    Discharge Diagnosis:  1.   Chest pain.  2.   Atherosclerotic coronary artery disease.    3.   100% occlusion of the saphenous vein graft to the obtuse marginal branch with unsuccessful percutaneous intervention with unsuccessful establishment of flow due to distal poor runoff.  4.  90% stenosis in the saphenous vein graft to the diagonal branch with a sequential 70% stenosis.  5.  Luminal irregularity in the left anterior descending artery with 1/2% occlusion of the diagonal branch.  6.  Nonfunctioning left internal mammary artery.  7.  Coronary artery bypass grafting with:       a.   LIMA to the LAD.       b.   Saphenous vein graft to the obtuse marginal branch.       c.   Saphenous vein graft to the diagonal branch.  8.   Previous coronary angiogram done in  June 2019 which had revealed:  A.  Nonfunctioning left internal mammary artery with nonobstructive disease in the left anterior descending artery.  B .  No evidence of any obstructive disease noted in the right coronary artery.  C.  Saphenous vein graft to the obtuse marginal branch with sluggish flow with sustained patency in the proximal stent with 95 to 99% stenosis.  D.  Saphenous vein graft to the diagonal branch with 2 sequential stenosis of up to 80 to 90%.  E.  Nonobstructive disease in the native left anterior descending artery and circumflex artery  F.  Pronto thrombectomy of the saphenous vein graft to the obtuse marginal branch.  G.  PTCA and stenting of the saphenous vein graft to the obtuse marginal branch with deployment of 2.75 mm x 38 mm Xience Judy drug-eluting stent x2, 2.75 mm x 33 mm Xience Judy drug-eluting stent and a 2.25 mm x 12 mm Xience Judy drug-eluting stent  9.  Coronary angiogram done in 2015 had revealed:       a.   Nonfunctioning left internal mammary artery with  nonobstructive disease in the left anterior descending artery.       b.   Critical stenosis in the diagonal branch with a  patent saphenous vein graft to the diagonal branch.       c.   A 100% occlusion of the distal circumflex artery with a  saphenous vein graft to the obtuse marginal branch with 99% stenosis status post Pronto thrombectomy of the saphenous vein graft to the obtuse marginal branch with deployment of a 2.5 mm x 23 mm and a 2.5 mm x 12 mm expedition stent.       d.   No evidence of any obstructive disease noted in the right coronary artery.  10.   Arterial hypertension with hypertensive heart disease.  11.   Concentric left ventricular hypertrophy with diastolic dysfunction.  12.   Mild mitral, mild tricuspid regurgitation.  13.  Hyperlipidemia.  14.  Transient ischemic attack.  15.  Nonerosive gastritis.  16.  Gastrointestinal bleeding with subsequent stopping of the antiplatelet therapy Plavix.  17.  Strong family history for coronary artery disease.  18.  Hypothyroidism.          Presenting Problem/History of Present Illness  Chest pain, unspecified type [R07.9]  Atherosclerosis of autologous vein coronary artery bypass graft with unstable angina pectoris (CMS/HCC) [I25.710]  Atherosclerosis of native coronary artery of native heart with unstable angina pectoris (CMS/HCC) [I25.110]  Chest pain, unspecified type [R07.9]    Lawrence Danielson is a 64 y.o. male     Body mass index is 29.23 kg/m². With a past medical history significant for atherosclerotic coronary artery disease with the first coronary angiogram done in 2003 which had revealed 100% occlusion of the distal circumflex artery with critical stenosis in the left anterior descending artery and right coronary artery with subsequent coronary artery bypass grafting with a LIMA to the LAD, saphenous vein graft to the obtuse marginal branch, saphenous vein graft to the first diagonal branch,  coronary angiogram done in December 2015 which had revealed a patent saphenous vein graft to the diagonal branch with a nonfunctioning left internal mammary artery, 99%  stenosis in the saphenous vein graft to the obtuse marginal branch, PTCA and stenting of the saphenous vein graft to the obtuse marginal branch with deployment of a 2.5 mm x 23 mm and a 2.5 mm x 12 mm expedition stent, last coronary angiogram done in June 2019 which had revealed nonfunctioning left internal mammary artery with nonobstructive disease in the left anterior descending artery and no evidence of any obstructive disease noted in the right coronary artery with a saphenous vein graft to the obtuse marginal branch with sluggish flow with 95 to 99% stenosis and a saphenous vein graft to the diagonal branch with 2 sequential 80 to 90% stenosis with successful Pronto thrombectomy of the saphenous vein graft to the obtuse marginal branch and PTCA and stenting of the saphenous vein graft to the obtuse marginal branch with deployment of a 2.75 mm x 38 mm Xience Judy drug-eluting stent x2 and a 2.75 mm x 33 mm Xience Judy drug-eluting stent and a 2.25 mm x 12 mm Xience Judy drug-eluting stent, with the diagonal branch stenosis which was treated medically, arterial hypertension, hypertensive heart disease, hyperlipidemia, and strong family history for coronary artery disease.     Patient had done reasonably well since the last coronary intervention.  Patient has had a few episodes of chest pain and was treated medically.     Patient now presents with recurrent symptoms of chest pain which has been increasing in frequency and severity.  Patient describes the chest pain is similar in quality as to one which he had prior to his stent placement.  Patient on further questioning denies any fever chill patient denies any hemoptysis hematuria bright red blood per rectum.     Due to the patient's symptoms of substernal chest discomfort suggestive of crescendo unstable angina pectoris patient was recommended a coronary angiogram.  Patient was brought in electively for a coronary angiogram.      Hospital Course  Patient  is a 64 y.o. male presented with  With a past medical history significant for atherosclerotic coronary artery disease with the first coronary angiogram done in 2003 which had revealed 100% occlusion of the distal circumflex artery with critical stenosis in the left anterior descending artery and right coronary artery with subsequent coronary artery bypass grafting with a LIMA to the LAD, saphenous vein graft to the obtuse marginal branch, saphenous vein graft to the first diagonal branch,  coronary angiogram done in December 2015 which had revealed a patent saphenous vein graft to the diagonal branch with a nonfunctioning left internal mammary artery, 99% stenosis in the saphenous vein graft to the obtuse marginal branch, PTCA and stenting of the saphenous vein graft to the obtuse marginal branch with deployment of a 2.5 mm x 23 mm and a 2.5 mm x 12 mm expedition stent, last coronary angiogram done in June 2019 which had revealed nonfunctioning left internal mammary artery with nonobstructive disease in the left anterior descending artery and no evidence of any obstructive disease noted in the right coronary artery with a saphenous vein graft to the obtuse marginal branch with sluggish flow with 95 to 99% stenosis and a saphenous vein graft to the diagonal branch with 2 sequential 80 to 90% stenosis with successful Pronto thrombectomy of the saphenous vein graft to the obtuse marginal branch and PTCA and stenting of the saphenous vein graft to the obtuse marginal branch with deployment of a 2.75 mm x 38 mm Xience Judy drug-eluting stent x2 and a 2.75 mm x 33 mm Xience Judy drug-eluting stent and a 2.25 mm x 12 mm Xience Judy drug-eluting stent, with the diagonal branch stenosis which was treated medically, arterial hypertension, hypertensive heart disease, hyperlipidemia, and strong family history for coronary artery disease.     Patient had done reasonably well since the last coronary intervention.  Patient  has had a few episodes of chest pain and was treated medically.     Patient now presents with recurrent symptoms of chest pain which has been increasing in frequency and severity.  Patient describes the chest pain is similar in quality as to one which he had prior to his stent placement.  Patient on further questioning denies any fever chill patient denies any hemoptysis hematuria bright red blood per rectum.    Patient was brought to same-day surgery for coronary angiogram.  Patient coronary angiogram revealed 100% occlusion of the saphenous vein graft to the obtuse marginal branch and 90% stenosis of the saphenous vein graft to the diagonal branch with 100% occlusion of the diagonal branch and nonobstructive disease in the left anterior descending artery.  Patient had 100% occlusion of the obtuse marginal branch.      Patient underwent PTCA of the 100% occluded saphenous vein graft to the obtuse marginal branch.  Patient had establishment of flow up to the mid to distal portion of the body of the vein graft with no distal runoff.  The plan was not to consider any stenting.  Patient was recommended medical management for the saphenous vein graft to the obtuse marginal branch.  Patient was recommended stage PTCA of the saphenous vein graft to the diagonal branch.  Patient did not have recurrent symptoms of chest pain.  Patient postprocedure was transferred to the stepdown unit.  Patient had ambulation in the hallway.  Patient was subsequently discharged home in stable condition to follow-up in 6 weeks.        Procedures Performed  Procedure(s):  Left Heart Cath 8/12/2019 @ 9:00 AM     A.  Left heart catheterization with the left ventriculogram and iliofemoral arteriogram and Perclose closure device.  B.  PTCA of the saphenous vein graft to the obtuse marginal branch with 100% occluded.  C.  Selective cannulation of the saphenous vein graft to the diagonal branch  Consults:   Consults     No orders found for last 30  day(s).          Pertinent Test Results:     Ejection Fraction  No results found for: EF  55%    Echo EF Estimated  Lab Results   Component Value Date    ASCENCIONEST 58 06/27/2019       Nuclear Stress Ejection Fraction  No components found for: NUCEF    Cath Ejection Fraction Quantitative  No results found for: CATHEF    Condition on Discharge: Stable    Physical Exam at Discharge    Vital Signs  Temp:  [96.9 °F (36.1 °C)-97.8 °F (36.6 °C)] 97 °F (36.1 °C)  Heart Rate:  [71-85] 71  Resp:  [16-18] 16  BP: (123-158)/(60-85) 124/75    Physical Exam:     General Appearance:    Alert, cooperative, in no acute distress   Head:    Normocephalic, without obvious abnormality, atraumatic   Eyes:            Lids and lashes normal, conjunctivae and sclerae normal, no   icterus, no pallor, corneas clear, PERRLA   Ears:    Ears appear intact with no abnormalities noted   Throat:   No oral lesions, no thrush, oral mucosa moist   Neck:   No adenopathy, supple, trachea midline, no thyromegaly, no   carotid bruit, no JVD   Back:     No kyphosis present, no scoliosis present, no skin lesions,      erythema or scars, no tenderness to percussion or                   palpation,   range of motion normal   Lungs:     Clear to auscultation,respirations regular, even and                  unlabored    Heart:    Regular rhythm and normal rate, normal S1 and S2, no            murmur, no gallop, no rub, no click   Chest Wall:    No abnormalities observed   Abdomen:     Normal bowel sounds, no masses, no organomegaly, soft        non-tender, non-distended, no guarding, no rebound                tenderness   Rectal:     Deferred   Extremities:   Moves all extremities well, no edema, no cyanosis, no             Redness right groin did not reveal of any evidence of any hematoma   Pulses:   Pulses palpable and equal bilaterally   Skin:   No bleeding, bruising or rash   Lymph nodes:   No palpable adenopathy   Neurologic:   Cranial nerves 2 - 12 grossly  intact, sensation intact, DTR       present and equal bilaterally       Discharge Disposition  Home or Self Care    Discharge Medications     Discharge Medications      Continue These Medications      Instructions Start Date   aspirin 81 MG EC tablet   81 mg, Oral, Daily      folic acid 400 MCG tablet  Commonly known as:  FOLVITE   400 mcg, Oral, Daily      levothyroxine 25 MCG tablet  Commonly known as:  SYNTHROID, LEVOTHROID   25 mcg, Oral, Daily      metoprolol tartrate 25 MG tablet  Commonly known as:  LOPRESSOR   25 mg, Oral, 2 Times Daily      omeprazole 20 MG capsule  Commonly known as:  priLOSEC   20 mg, Oral, Daily      ranolazine 500 MG 12 hr tablet  Commonly known as:  RANEXA   500 mg, Oral, 2 Times Daily      rosuvastatin 10 MG tablet  Commonly known as:  CRESTOR   10 mg, Oral, Daily      ticagrelor 90 MG tablet tablet  Commonly known as:  BRILINTA   90 mg, Oral, 2 Times Daily      vitamin B-12 100 MCG tablet  Commonly known as:  CYANOCOBALAMIN   50 mcg, Oral, Daily             Discharge Diet: Cardiac diet    Activity at Discharge:  Tolerated    Follow-up Appointments  No future appointments.      Test Results Pending at Discharge       Patricio Barnard MD  08/13/19  9:54 AM    Time: Discharge 55 min    Dictated utilizing Dragon dictation.

## 2019-08-13 NOTE — PLAN OF CARE
Problem: Patient Care Overview  Goal: Plan of Care Review  Outcome: Ongoing (interventions implemented as appropriate)   08/12/19 1621   Coping/Psychosocial   Plan of Care Reviewed With patient;spouse   Plan of Care Review   Progress improving   OTHER   Outcome Summary Patient has done well since arrival from Cath lab. Right groin site Perclose device is soft and non tender to touch. Bed rest x 4 hours per verbal order from Dr Barnard. VSS.      Goal: Individualization and Mutuality   08/12/19 0736   Mutuality/Individual Preferences   What Information Would Help Us Give You More Personalized Care? patient likes to be called Don     Goal: Discharge Needs Assessment   08/12/19 0737 08/12/19 1330 08/12/19 2036   Discharge Needs Assessment   Readmission Within the Last 30 Days --  --  no previous admission in last 30 days;planned readmission   Patient/Family Anticipates Transition to --  --  home with family   Patient/Family Anticipated Services at Transition --  none --    Transportation Concerns --  car, none --    Transportation Anticipated --  car, drives self --    Disability   Equipment Currently Used at Home none --  --        Problem: Cardiac Catheterization (Diagnostic/Interventional) (Adult)  Intervention: Prevent/Manage Pain   08/12/19 2036   Promote Oxygenation/Perfusion   Pain Management Interventions medication offered but refused;pain management plan reviewed with patient/caregiver;quiet environment facilitated     Intervention: Prevent Vascular Access Site Complications   08/12/19 2036   Activity   Activity Management activity adjusted per tolerance;ambulated to bathroom   Positioning   Head of Bed (HOB) HOB at 30-45 degrees   Safety Interventions   Bleeding Precautions blood pressure closely monitored;foot protection facilitated;monitored for signs of bleeding     Intervention: Maintain Extremity in Straight Position Post-Procedure   08/12/19 2000   Positioning   Body Position supine       Goal: Signs  and Symptoms of Listed Potential Problems Will be Absent, Minimized or Managed (Cardiac Catheterization)   08/12/19 2036   Goal/Outcome Evaluation   Problems Assessed (Cardiac Catheterization) all   Problems Present (Cardiac Cath) none     Goal: Anesthesia/Sedation Recovery   08/12/19 2036   Goal/Outcome Evaluation   Anesthesia/Sedation Recovery criteria met for transfer;criteria met for discharge

## 2019-08-13 NOTE — PROGRESS NOTES
Patient seen post PCI.  Patient right groin did not reveal of any evidence of any hematoma.  Patient has not had any further recurrent symptoms or chest pain.  Patient telemetry monitor did not reveal of any evidence of any arrhythmia.    Patient would ambulate in the hallway and if the patient is stable possibly discharge home tomorrow.

## 2019-09-26 ENCOUNTER — PREP FOR SURGERY (OUTPATIENT)
Dept: OTHER | Facility: HOSPITAL | Age: 64
End: 2019-09-26

## 2019-09-26 DIAGNOSIS — I20.0 UNSTABLE ANGINA (HCC): Primary | ICD-10-CM

## 2019-09-26 RX ORDER — SODIUM CHLORIDE 0.9 % (FLUSH) 0.9 %
3 SYRINGE (ML) INJECTION EVERY 12 HOURS SCHEDULED
Status: CANCELLED | OUTPATIENT
Start: 2019-09-30

## 2019-09-26 RX ORDER — SODIUM CHLORIDE 9 MG/ML
75 INJECTION, SOLUTION INTRAVENOUS CONTINUOUS
Status: CANCELLED | OUTPATIENT
Start: 2019-09-30

## 2019-09-26 RX ORDER — SODIUM CHLORIDE 0.9 % (FLUSH) 0.9 %
10 SYRINGE (ML) INJECTION AS NEEDED
Status: CANCELLED | OUTPATIENT
Start: 2019-09-30

## 2019-09-30 ENCOUNTER — HOSPITAL ENCOUNTER (OUTPATIENT)
Facility: HOSPITAL | Age: 64
Discharge: HOME | End: 2019-10-01
Attending: INTERNAL MEDICINE | Admitting: INTERNAL MEDICINE

## 2019-09-30 DIAGNOSIS — I20.0 UNSTABLE ANGINA (HCC): ICD-10-CM

## 2019-09-30 LAB
ANION GAP SERPL CALCULATED.3IONS-SCNC: 14 MMOL/L (ref 5–15)
BASOPHILS # BLD AUTO: 0.07 10*3/MM3 (ref 0–0.2)
BASOPHILS NFR BLD AUTO: 1.1 % (ref 0–1.5)
BUN BLD-MCNC: 8 MG/DL (ref 8–23)
BUN/CREAT SERPL: 8.2 (ref 7–25)
CALCIUM SPEC-SCNC: 9.3 MG/DL (ref 8.6–10.5)
CHLORIDE SERPL-SCNC: 99 MMOL/L (ref 98–107)
CO2 SERPL-SCNC: 25 MMOL/L (ref 22–29)
CREAT BLD-MCNC: 0.98 MG/DL (ref 0.76–1.27)
DEPRECATED RDW RBC AUTO: 39.2 FL (ref 37–54)
EOSINOPHIL # BLD AUTO: 0.27 10*3/MM3 (ref 0–0.4)
EOSINOPHIL NFR BLD AUTO: 4.3 % (ref 0.3–6.2)
ERYTHROCYTE [DISTWIDTH] IN BLOOD BY AUTOMATED COUNT: 11.9 % (ref 12.3–15.4)
GFR SERPL CREATININE-BSD FRML MDRD: 77 ML/MIN/1.73
GLUCOSE BLD-MCNC: 102 MG/DL (ref 65–99)
HCT VFR BLD AUTO: 44.3 % (ref 37.5–51)
HGB BLD-MCNC: 15.6 G/DL (ref 13–17.7)
IMM GRANULOCYTES # BLD AUTO: 0.02 10*3/MM3 (ref 0–0.05)
IMM GRANULOCYTES NFR BLD AUTO: 0.3 % (ref 0–0.5)
INR PPP: 0.95 (ref 0.8–1.2)
LYMPHOCYTES # BLD AUTO: 1.66 10*3/MM3 (ref 0.7–3.1)
LYMPHOCYTES NFR BLD AUTO: 26.2 % (ref 19.6–45.3)
MCH RBC QN AUTO: 32 PG (ref 26.6–33)
MCHC RBC AUTO-ENTMCNC: 35.2 G/DL (ref 31.5–35.7)
MCV RBC AUTO: 91 FL (ref 79–97)
MONOCYTES # BLD AUTO: 0.83 10*3/MM3 (ref 0.1–0.9)
MONOCYTES NFR BLD AUTO: 13.1 % (ref 5–12)
NEUTROPHILS # BLD AUTO: 3.49 10*3/MM3 (ref 1.7–7)
NEUTROPHILS NFR BLD AUTO: 55 % (ref 42.7–76)
NRBC BLD AUTO-RTO: 0 /100 WBC (ref 0–0.2)
PLATELET # BLD AUTO: 201 10*3/MM3 (ref 140–450)
PMV BLD AUTO: 9.8 FL (ref 6–12)
POTASSIUM BLD-SCNC: 4.2 MMOL/L (ref 3.5–5.2)
PROTHROMBIN TIME: 12.5 SECONDS (ref 11.1–15.3)
RBC # BLD AUTO: 4.87 10*6/MM3 (ref 4.14–5.8)
SODIUM BLD-SCNC: 138 MMOL/L (ref 136–145)
WBC NRBC COR # BLD: 6.34 10*3/MM3 (ref 3.4–10.8)

## 2019-09-30 PROCEDURE — C1725 CATH, TRANSLUMIN NON-LASER: HCPCS | Performed by: INTERNAL MEDICINE

## 2019-09-30 PROCEDURE — 25010000002 HYDROCORTISONE SODIUM SUCCINATE 100 MG RECONSTITUTED SOLUTION: Performed by: INTERNAL MEDICINE

## 2019-09-30 PROCEDURE — 25010000002 ONDANSETRON PER 1 MG: Performed by: INTERNAL MEDICINE

## 2019-09-30 PROCEDURE — 25010000002 BIVALIRUDIN TRIFLUOROACETATE 250 MG RECONSTITUTED SOLUTION 1 EACH VIAL: Performed by: INTERNAL MEDICINE

## 2019-09-30 PROCEDURE — C1769 GUIDE WIRE: HCPCS | Performed by: INTERNAL MEDICINE

## 2019-09-30 PROCEDURE — 85025 COMPLETE CBC W/AUTO DIFF WBC: CPT | Performed by: INTERNAL MEDICINE

## 2019-09-30 PROCEDURE — 85610 PROTHROMBIN TIME: CPT | Performed by: INTERNAL MEDICINE

## 2019-09-30 PROCEDURE — 93010 ELECTROCARDIOGRAM REPORT: CPT | Performed by: INTERNAL MEDICINE

## 2019-09-30 PROCEDURE — 25010000002 FENTANYL CITRATE (PF) 100 MCG/2ML SOLUTION: Performed by: INTERNAL MEDICINE

## 2019-09-30 PROCEDURE — 25010000002 DIPHENHYDRAMINE PER 50 MG: Performed by: INTERNAL MEDICINE

## 2019-09-30 PROCEDURE — 93455 CORONARY ART/GRFT ANGIO S&I: CPT | Performed by: INTERNAL MEDICINE

## 2019-09-30 PROCEDURE — 93005 ELECTROCARDIOGRAM TRACING: CPT | Performed by: INTERNAL MEDICINE

## 2019-09-30 PROCEDURE — C9604 PERC D-E COR REVASC T CABG S: HCPCS | Performed by: INTERNAL MEDICINE

## 2019-09-30 PROCEDURE — C1874 STENT, COATED/COV W/DEL SYS: HCPCS | Performed by: INTERNAL MEDICINE

## 2019-09-30 PROCEDURE — 80048 BASIC METABOLIC PNL TOTAL CA: CPT | Performed by: INTERNAL MEDICINE

## 2019-09-30 PROCEDURE — 25010000002 MIDAZOLAM PER 1 MG: Performed by: INTERNAL MEDICINE

## 2019-09-30 PROCEDURE — C1887 CATHETER, GUIDING: HCPCS | Performed by: INTERNAL MEDICINE

## 2019-09-30 PROCEDURE — 25010000002 PHENYLEPHRINE PER 1 ML: Performed by: INTERNAL MEDICINE

## 2019-09-30 PROCEDURE — C1760 CLOSURE DEV, VASC: HCPCS | Performed by: INTERNAL MEDICINE

## 2019-09-30 PROCEDURE — C1894 INTRO/SHEATH, NON-LASER: HCPCS | Performed by: INTERNAL MEDICINE

## 2019-09-30 PROCEDURE — 93454 CORONARY ARTERY ANGIO S&I: CPT | Performed by: INTERNAL MEDICINE

## 2019-09-30 PROCEDURE — G0378 HOSPITAL OBSERVATION PER HR: HCPCS

## 2019-09-30 PROCEDURE — 0 IOPAMIDOL PER 1 ML: Performed by: INTERNAL MEDICINE

## 2019-09-30 DEVICE — XIENCE SIERRA™ EVEROLIMUS ELUTING CORONARY STENT SYSTEM 3.50 MM X 08 MM / RAPID-EXCHANGE
Type: IMPLANTABLE DEVICE | Status: FUNCTIONAL
Brand: XIENCE SIERRA™

## 2019-09-30 DEVICE — XIENCE SIERRA™ EVEROLIMUS ELUTING CORONARY STENT SYSTEM 3.50 MM X 38 MM / RAPID-EXCHANGE
Type: IMPLANTABLE DEVICE | Status: FUNCTIONAL
Brand: XIENCE SIERRA™

## 2019-09-30 RX ORDER — MIDAZOLAM HYDROCHLORIDE 1 MG/ML
INJECTION INTRAMUSCULAR; INTRAVENOUS AS NEEDED
Status: DISCONTINUED | OUTPATIENT
Start: 2019-09-30 | End: 2019-09-30 | Stop reason: HOSPADM

## 2019-09-30 RX ORDER — RANOLAZINE 500 MG/1
500 TABLET, EXTENDED RELEASE ORAL EVERY 12 HOURS SCHEDULED
Status: DISCONTINUED | OUTPATIENT
Start: 2019-09-30 | End: 2019-09-30

## 2019-09-30 RX ORDER — LEVOTHYROXINE SODIUM 0.03 MG/1
25 TABLET ORAL DAILY
Status: DISCONTINUED | OUTPATIENT
Start: 2019-09-30 | End: 2019-10-01 | Stop reason: HOSPADM

## 2019-09-30 RX ORDER — ROSUVASTATIN CALCIUM 10 MG/1
10 TABLET, COATED ORAL DAILY
Status: DISCONTINUED | OUTPATIENT
Start: 2019-09-30 | End: 2019-10-01 | Stop reason: HOSPADM

## 2019-09-30 RX ORDER — FOLIC ACID 1 MG/1
500 TABLET ORAL DAILY
Status: DISCONTINUED | OUTPATIENT
Start: 2019-09-30 | End: 2019-10-01 | Stop reason: HOSPADM

## 2019-09-30 RX ORDER — ASPIRIN 81 MG/1
81 TABLET ORAL DAILY
Status: DISCONTINUED | OUTPATIENT
Start: 2019-09-30 | End: 2019-10-01 | Stop reason: HOSPADM

## 2019-09-30 RX ORDER — NITROGLYCERIN 5 MG/ML
INJECTION, SOLUTION INTRAVENOUS AS NEEDED
Status: DISCONTINUED | OUTPATIENT
Start: 2019-09-30 | End: 2019-09-30 | Stop reason: HOSPADM

## 2019-09-30 RX ORDER — DIPHENHYDRAMINE HYDROCHLORIDE 50 MG/ML
50 INJECTION INTRAMUSCULAR; INTRAVENOUS ONCE
Status: COMPLETED | OUTPATIENT
Start: 2019-09-30 | End: 2019-09-30

## 2019-09-30 RX ORDER — SODIUM CHLORIDE 9 MG/ML
100 INJECTION, SOLUTION INTRAVENOUS CONTINUOUS
Status: DISCONTINUED | OUTPATIENT
Start: 2019-09-30 | End: 2019-09-30

## 2019-09-30 RX ORDER — SODIUM CHLORIDE 9 MG/ML
75 INJECTION, SOLUTION INTRAVENOUS CONTINUOUS
Status: DISCONTINUED | OUTPATIENT
Start: 2019-09-30 | End: 2019-09-30

## 2019-09-30 RX ORDER — ONDANSETRON 2 MG/ML
INJECTION INTRAMUSCULAR; INTRAVENOUS AS NEEDED
Status: DISCONTINUED | OUTPATIENT
Start: 2019-09-30 | End: 2019-09-30 | Stop reason: HOSPADM

## 2019-09-30 RX ORDER — SODIUM CHLORIDE 0.9 % (FLUSH) 0.9 %
10 SYRINGE (ML) INJECTION AS NEEDED
Status: DISCONTINUED | OUTPATIENT
Start: 2019-09-30 | End: 2019-09-30 | Stop reason: HOSPADM

## 2019-09-30 RX ORDER — FENTANYL CITRATE 50 UG/ML
INJECTION, SOLUTION INTRAMUSCULAR; INTRAVENOUS AS NEEDED
Status: DISCONTINUED | OUTPATIENT
Start: 2019-09-30 | End: 2019-09-30 | Stop reason: HOSPADM

## 2019-09-30 RX ORDER — RANOLAZINE 500 MG/1
1000 TABLET, EXTENDED RELEASE ORAL EVERY 12 HOURS SCHEDULED
Status: DISCONTINUED | OUTPATIENT
Start: 2019-09-30 | End: 2019-10-01 | Stop reason: HOSPADM

## 2019-09-30 RX ORDER — UBIDECARENONE 75 MG
50 CAPSULE ORAL DAILY
Status: DISCONTINUED | OUTPATIENT
Start: 2019-09-30 | End: 2019-10-01 | Stop reason: HOSPADM

## 2019-09-30 RX ORDER — SODIUM CHLORIDE 0.9 % (FLUSH) 0.9 %
3 SYRINGE (ML) INJECTION EVERY 12 HOURS SCHEDULED
Status: DISCONTINUED | OUTPATIENT
Start: 2019-09-30 | End: 2019-09-30 | Stop reason: HOSPADM

## 2019-09-30 RX ORDER — LIDOCAINE HYDROCHLORIDE 20 MG/ML
INJECTION, SOLUTION INFILTRATION; PERINEURAL AS NEEDED
Status: DISCONTINUED | OUTPATIENT
Start: 2019-09-30 | End: 2019-09-30 | Stop reason: HOSPADM

## 2019-09-30 RX ORDER — HYDROCODONE BITARTRATE AND ACETAMINOPHEN 7.5; 325 MG/1; MG/1
1 TABLET ORAL ONCE AS NEEDED
Status: COMPLETED | OUTPATIENT
Start: 2019-09-30 | End: 2019-09-30

## 2019-09-30 RX ADMIN — TICAGRELOR 90 MG: 90 TABLET ORAL at 20:54

## 2019-09-30 RX ADMIN — HYDROCORTISONE SODIUM SUCCINATE 100 MG: 100 INJECTION, POWDER, FOR SOLUTION INTRAMUSCULAR; INTRAVENOUS at 09:38

## 2019-09-30 RX ADMIN — HYDROCODONE BITARTRATE AND ACETAMINOPHEN 1 TABLET: 7.5; 325 TABLET ORAL at 23:00

## 2019-09-30 RX ADMIN — ASPIRIN 81 MG: 81 TABLET, COATED ORAL at 13:30

## 2019-09-30 RX ADMIN — SODIUM CHLORIDE 100 ML/HR: 9 INJECTION, SOLUTION INTRAVENOUS at 12:35

## 2019-09-30 RX ADMIN — ROSUVASTATIN CALCIUM 10 MG: 10 TABLET, FILM COATED ORAL at 13:29

## 2019-09-30 RX ADMIN — TICAGRELOR 90 MG: 90 TABLET ORAL at 13:29

## 2019-09-30 RX ADMIN — RANOLAZINE 1000 MG: 500 TABLET, FILM COATED, EXTENDED RELEASE ORAL at 20:54

## 2019-09-30 RX ADMIN — DIPHENHYDRAMINE HYDROCHLORIDE 50 MG: 50 INJECTION INTRAMUSCULAR; INTRAVENOUS at 09:41

## 2019-09-30 RX ADMIN — SODIUM CHLORIDE 75 ML/HR: 9 INJECTION, SOLUTION INTRAVENOUS at 08:04

## 2019-09-30 RX ADMIN — METOPROLOL TARTRATE 25 MG: 25 TABLET ORAL at 21:05

## 2019-10-01 ENCOUNTER — DOCUMENTATION (OUTPATIENT)
Dept: CARDIAC REHAB | Facility: HOSPITAL | Age: 64
End: 2019-10-01

## 2019-10-01 VITALS
RESPIRATION RATE: 18 BRPM | DIASTOLIC BLOOD PRESSURE: 74 MMHG | SYSTOLIC BLOOD PRESSURE: 128 MMHG | WEIGHT: 199.52 LBS | OXYGEN SATURATION: 91 % | HEART RATE: 77 BPM | HEIGHT: 70 IN | TEMPERATURE: 97.8 F | BODY MASS INDEX: 28.56 KG/M2

## 2019-10-01 LAB
ANION GAP SERPL CALCULATED.3IONS-SCNC: 11 MMOL/L (ref 5–15)
BUN BLD-MCNC: 7 MG/DL (ref 8–23)
BUN/CREAT SERPL: 8 (ref 7–25)
CALCIUM SPEC-SCNC: 8.9 MG/DL (ref 8.6–10.5)
CHLORIDE SERPL-SCNC: 98 MMOL/L (ref 98–107)
CO2 SERPL-SCNC: 27 MMOL/L (ref 22–29)
CREAT BLD-MCNC: 0.87 MG/DL (ref 0.76–1.27)
DEPRECATED RDW RBC AUTO: 39.3 FL (ref 37–54)
ERYTHROCYTE [DISTWIDTH] IN BLOOD BY AUTOMATED COUNT: 11.8 % (ref 12.3–15.4)
GFR SERPL CREATININE-BSD FRML MDRD: 88 ML/MIN/1.73
GLUCOSE BLD-MCNC: 120 MG/DL (ref 65–99)
HCT VFR BLD AUTO: 44.4 % (ref 37.5–51)
HGB BLD-MCNC: 15.5 G/DL (ref 13–17.7)
MCH RBC QN AUTO: 32 PG (ref 26.6–33)
MCHC RBC AUTO-ENTMCNC: 34.9 G/DL (ref 31.5–35.7)
MCV RBC AUTO: 91.5 FL (ref 79–97)
PLATELET # BLD AUTO: 205 10*3/MM3 (ref 140–450)
PMV BLD AUTO: 10 FL (ref 6–12)
POTASSIUM BLD-SCNC: 4.1 MMOL/L (ref 3.5–5.2)
RBC # BLD AUTO: 4.85 10*6/MM3 (ref 4.14–5.8)
SODIUM BLD-SCNC: 136 MMOL/L (ref 136–145)
WBC NRBC COR # BLD: 11.07 10*3/MM3 (ref 3.4–10.8)

## 2019-10-01 PROCEDURE — G0378 HOSPITAL OBSERVATION PER HR: HCPCS

## 2019-10-01 PROCEDURE — 85027 COMPLETE CBC AUTOMATED: CPT | Performed by: INTERNAL MEDICINE

## 2019-10-01 PROCEDURE — 80048 BASIC METABOLIC PNL TOTAL CA: CPT | Performed by: INTERNAL MEDICINE

## 2019-10-01 RX ADMIN — RANOLAZINE 1000 MG: 500 TABLET, FILM COATED, EXTENDED RELEASE ORAL at 09:50

## 2019-10-01 RX ADMIN — LEVOTHYROXINE SODIUM 25 MCG: 25 TABLET ORAL at 09:50

## 2019-10-01 RX ADMIN — Medication 500 MCG: at 09:51

## 2019-10-01 RX ADMIN — METOPROLOL TARTRATE 25 MG: 25 TABLET ORAL at 09:50

## 2019-10-01 RX ADMIN — ROSUVASTATIN CALCIUM 10 MG: 10 TABLET, FILM COATED ORAL at 09:51

## 2019-10-01 RX ADMIN — ASPIRIN 81 MG: 81 TABLET, COATED ORAL at 09:51

## 2019-10-01 RX ADMIN — TICAGRELOR 90 MG: 90 TABLET ORAL at 09:50

## 2019-10-01 RX ADMIN — VITAM B12 50 MCG: 100 TAB at 09:50

## 2019-10-01 NOTE — PROGRESS NOTES
Patient seen post PTCA and stenting of the saphenous vein graft to the diagonal branch.  Patient right groin does not reveal of any evidence of any hematoma.    Patient would ambulate in the hallway.    If the patient did not have any evidence of any symptoms of chest pain shortness of breath patient may be stable to be discharged in the morning.

## 2019-10-01 NOTE — PLAN OF CARE
Problem: Patient Care Overview  Goal: Plan of Care Review  Outcome: Ongoing (interventions implemented as appropriate)   10/01/19 1512   Coping/Psychosocial   Plan of Care Reviewed With patient;family   Plan of Care Review   Progress improving     Goal: Individualization and Mutuality  Outcome: Ongoing (interventions implemented as appropriate)    Goal: Discharge Needs Assessment  Outcome: Ongoing (interventions implemented as appropriate)    Goal: Interprofessional Rounds/Family Conf  Outcome: Ongoing (interventions implemented as appropriate)      Problem: Cardiac Catheterization (Diagnostic/Interventional) (Adult)  Goal: Signs and Symptoms of Listed Potential Problems Will be Absent, Minimized or Managed (Cardiac Catheterization)  Outcome: Ongoing (interventions implemented as appropriate)      Problem: Hypertensive Disease/Crisis (Arterial) (Adult)  Goal: Signs and Symptoms of Listed Potential Problems Will be Absent, Minimized or Managed (Hypertensive Disease/Crisis)  Outcome: Ongoing (interventions implemented as appropriate)      Problem: Pain, Acute (Adult)  Goal: Identify Related Risk Factors and Signs and Symptoms  Outcome: Ongoing (interventions implemented as appropriate)    Goal: Acceptable Pain Control/Comfort Level  Outcome: Ongoing (interventions implemented as appropriate)

## 2019-10-01 NOTE — PLAN OF CARE
Problem: Patient Care Overview  Goal: Plan of Care Review  Outcome: Ongoing (interventions implemented as appropriate)   10/01/19 0442   Coping/Psychosocial   Plan of Care Reviewed With patient   Plan of Care Review   Progress improving   OTHER   Outcome Summary Pt post HC with stent to SVG, on Brilinta, ambulates independently, vss     Goal: Individualization and Mutuality  Outcome: Ongoing (interventions implemented as appropriate)    Goal: Discharge Needs Assessment  Outcome: Ongoing (interventions implemented as appropriate)      Problem: Cardiac Catheterization (Diagnostic/Interventional) (Adult)  Goal: Signs and Symptoms of Listed Potential Problems Will be Absent, Minimized or Managed (Cardiac Catheterization)  Outcome: Ongoing (interventions implemented as appropriate)      Problem: Hypertensive Disease/Crisis (Arterial) (Adult)  Goal: Signs and Symptoms of Listed Potential Problems Will be Absent, Minimized or Managed (Hypertensive Disease/Crisis)  Outcome: Ongoing (interventions implemented as appropriate)      Problem: Pain, Acute (Adult)  Goal: Identify Related Risk Factors and Signs and Symptoms  Outcome: Ongoing (interventions implemented as appropriate)    Goal: Acceptable Pain Control/Comfort Level  Outcome: Ongoing (interventions implemented as appropriate)

## 2019-10-02 NOTE — DISCHARGE SUMMARY
Date of Discharge:  10/1/2019    Discharge Diagnosis:  1.  Chest pain.  2.  Atherosclerotic coronary artery disease.  3.  PTCA and stenting of the saphenous vein graft to the diagonal branch with deployment of a 3.5 mm x 8 mm and a 3.5 mm x 38 mm Xience Judy drug-eluting stent.  4.  100% occlusion of the saphenous vein graft to the obtuse marginal branch.  5.  Luminal irregularity in the left anterior descending artery with 100% occlusion of the diagonal branch.  6.  Nonfunctioning left internal mammary artery.  7.  Coronary artery bypass grafting with:   a.   LIMA to the LAD.   b.   Saphenous vein graft to the obtuse marginal branch.   c.   Saphenous vein graft to the diagonal branch.  8.   Previous coronary angiogram done in  June 2019 which had revealed:  A.  Nonfunctioning left internal mammary artery with nonobstructive disease in the left anterior descending artery.  B .  No evidence of any obstructive disease noted in the right coronary artery.  C.  Saphenous vein graft to the obtuse marginal branch with sluggish flow with sustained patency in the proximal stent with 95 to 99% stenosis.  D.  Saphenous vein graft to the diagonal branch with 2 sequential stenosis of up to 80 to 90%.  E.  Nonobstructive disease in the native left anterior descending artery and circumflex artery  F.  Pronto thrombectomy of the saphenous vein graft to the obtuse marginal branch.  G.  PTCA and stenting of the saphenous vein graft to the obtuse marginal branch with deployment of 2.75 mm x 38 mm Xience Judy drug-eluting stent x2, 2.75 mm x 33 mm Xience Judy drug-eluting stent and a 2.25 mm x 12 mm Xience Judy drug-eluting stent  9.  Coronary angiogram done in 2015 had revealed:       a.   Nonfunctioning left internal mammary artery with  nonobstructive disease in the left anterior descending artery.       b.   Critical stenosis in the diagonal branch with a patent saphenous vein graft to the diagonal branch.       c.   A  100% occlusion of the distal circumflex artery with a  saphenous vein graft to the obtuse marginal branch with 99% stenosis status post Pronto thrombectomy of the saphenous vein graft to the obtuse marginal branch with deployment of a 2.5 mm x 23 mm and a 2.5 mm x 12 mm expedition stent.       d.   No evidence of any obstructive disease noted in the right coronary artery.  10.   Arterial hypertension with hypertensive heart disease.  11.   Concentric left ventricular hypertrophy with diastolic dysfunction.  12.   Mild mitral, mild tricuspid regurgitation.  13.  Hyperlipidemia.  14.  Transient ischemic attack.  15.  Nonerosive gastritis.  16.  Gastrointestinal bleeding with subsequent stopping of the antiplatelet therapy Plavix.  17.  Strong family history for coronary artery disease.  18.  Hypothyroidism.      Presenting Problem/History of Present Illness  Unstable angina (CMS/HCC) [I20.0]  Unstable angina (CMS/HCC) [I20.0]  Unstable angina (CMS/HCC) [I20.0]    Lawrence Danielson is a 64 y.o. male     There is no height or weight on file to calculate BMI. With a past medical history significant for atherosclerotic coronary artery disease with the first coronary angiogram done in 2003 which had revealed 100% occlusion of the distal circumflex artery with critical stenosis in the left anterior descending artery and right coronary artery with subsequent coronary artery bypass grafting with a LIMA to the LAD, saphenous vein graft to the obtuse marginal branch, saphenous vein graft to the first diagonal branch,  coronary angiogram done in December 2015 which had revealed a patent saphenous vein graft to the diagonal branch with a nonfunctioning left internal mammary artery, 99% stenosis in the saphenous vein graft to the obtuse marginal branch, PTCA and stenting of the saphenous vein graft to the obtuse marginal branch with deployment of a 2.5 mm x 23 mm and a 2.5 mm x 12 mm expedition stent, last coronary angiogram done  in June 2019 which had revealed nonfunctioning left internal mammary artery with nonobstructive disease in the left anterior descending artery and no evidence of any obstructive disease noted in the right coronary artery with a saphenous vein graft to the obtuse marginal branch with sluggish flow with 95 to 99% stenosis and a saphenous vein graft to the diagonal branch with 2 sequential 80 to 90% stenosis with successful Pronto thrombectomy of the saphenous vein graft to the obtuse marginal branch and PTCA and stenting of the saphenous vein graft to the obtuse marginal branch with deployment of a 2.75 mm x 38 mm Xience Judy drug-eluting stent x2 and a 2.75 mm x 33 mm Xience Judy drug-eluting stent and a 2.25 mm x 12 mm Xience Judy drug-eluting stent, with the diagonal branch stenosis which was treated medically, last coronary angiogram done in August 2019 which had revealed 100% occlusion of the saphenous vein graft to the obtuse marginal branch with Pronto thrombectomy of the saphenous vein graft to the obtuse marginal branch and PTCA and stenting of the saphenous vein graft to the obtuse marginal branch with deployment of a 2.75 mm x 38 mm Xience Judy drug-eluting stent x2 and a 2.75 mm x 33 mm Xience Judy drug-eluting stent and a 2.25 mm x 12 mm Xience Judy drug-eluting stent, 80 to 90% stenosis in the saphenous vein graft to the diagonal branch, arterial hypertension, hypertensive heart disease, hyperlipidemia, and strong family history for coronary artery disease.    Hospital Course  Lawrence Danielson is a 64 y.o. male     There is no height or weight on file to calculate BMI. With a past medical history significant for atherosclerotic coronary artery disease with the first coronary angiogram done in 2003 which had revealed 100% occlusion of the distal circumflex artery with critical stenosis in the left anterior descending artery and right coronary artery with subsequent coronary artery bypass  grafting with a LIMA to the LAD, saphenous vein graft to the obtuse marginal branch, saphenous vein graft to the first diagonal branch,  coronary angiogram done in December 2015 which had revealed a patent saphenous vein graft to the diagonal branch with a nonfunctioning left internal mammary artery, 99% stenosis in the saphenous vein graft to the obtuse marginal branch, PTCA and stenting of the saphenous vein graft to the obtuse marginal branch with deployment of a 2.5 mm x 23 mm and a 2.5 mm x 12 mm expedition stent, last coronary angiogram done in June 2019 which had revealed nonfunctioning left internal mammary artery with nonobstructive disease in the left anterior descending artery and no evidence of any obstructive disease noted in the right coronary artery with a saphenous vein graft to the obtuse marginal branch with sluggish flow with 95 to 99% stenosis and a saphenous vein graft to the diagonal branch with 2 sequential 80 to 90% stenosis with successful Pronto thrombectomy of the saphenous vein graft to the obtuse marginal branch and PTCA and stenting of the saphenous vein graft to the obtuse marginal branch with deployment of a 2.75 mm x 38 mm Xience Judy drug-eluting stent x2 and a 2.75 mm x 33 mm Xience Judy drug-eluting stent and a 2.25 mm x 12 mm Xience Judy drug-eluting stent, with the diagonal branch stenosis which was treated medically, last coronary angiogram done in August 2019 which had revealed 100% occlusion of the saphenous vein graft to the obtuse marginal branch with Pronto thrombectomy of the saphenous vein graft to the obtuse marginal branch and PTCA and stenting of the saphenous vein graft to the obtuse marginal branch with deployment of a 2.75 mm x 38 mm Xience Judy drug-eluting stent x2 and a 2.75 mm x 33 mm Xience Judy drug-eluting stent and a 2.25 mm x 12 mm Xience Judy drug-eluting stent, 80 to 90% stenosis in the saphenous vein graft to the diagonal branch, arterial  hypertension, hypertensive heart disease, hyperlipidemia, and strong family history for coronary artery disease.  Patient was brought to the cardiac catheterization lab.  Patient underwent coronary angiogram which revealed 100% occlusion of the saphenous vein graft to obtuse marginal branch.  Selective cannulation were done of the saphenous vein graft to the diagonal branch.  Patient underwent successful PTCA and stenting of the saphenous vein graft to the diagonal branch    With deployment of a 3.5 mm x 8 mm and a 3.5 mm x 38 mm Xience Judy drug-eluting stent.    Post stent deployment patient was transferred to the stepdown unit.  The patient arterial sheath was pulled and closed with an Angio-Seal closure device.  Patient had a few episodes of chest pain.  Patient was started on Ranexa.  Patient right groin did not reveal of any evidence of any hematoma.  Patient blood pressure remained stable.    Patient was discharged home in stable condition.  Procedures Performed  Procedure(s):  Left Heart Cath    1.  Left heart catheterization with selective cannulation of the saphenous vein graft to the diagonal branch and to the obtuse marginal branch and iliofemoral arteriogram and Angio-Seal closure device.    2.  PTCA and stenting of the saphenous vein graft to the diagonal branch with deployment of a 3.5 mm x 8 mm and a 3.5 mm x 38 mm Xience Judy drug-eluting stent.    Consults:   Consults     No orders found for last 30 day(s).          Pertinent Test Results:     Ejection Fraction  No results found for: EF      Echo EF Estimated 58%  Lab Results   Component Value Date    ECHOEFEST 58 06/27/2019       Nuclear Stress Ejection Fraction  No components found for: NUCEF    Cath Ejection Fraction Quantitative  No results found for: CATHEF    Condition on Discharge: Stable  Physical Exam at Discharge    Vital Signs  Temp:  [96.9 °F (36.1 °C)-97.8 °F (36.6 °C)] 97.8 °F (36.6 °C)  Heart Rate:  [70-80] 77  Resp:  [16-20]  18  BP: (124-143)/(66-83) 128/74    Physical Exam:     General Appearance:    Alert, cooperative, in no acute distress   Head:    Normocephalic, without obvious abnormality, atraumatic   Eyes:            Lids and lashes normal, conjunctivae and sclerae normal, no   icterus, no pallor, corneas clear, PERRLA   Ears:    Ears appear intact with no abnormalities noted   Throat:   No oral lesions, no thrush, oral mucosa moist   Neck:   No adenopathy, supple, trachea midline, no thyromegaly, no   carotid bruit, no JVD   Back:     No kyphosis present, no scoliosis present, no skin lesions,      erythema or scars, no tenderness to percussion or                   palpation,   range of motion normal   Lungs:     Clear to auscultation,respirations regular, even and                  unlabored    Heart:    Regular rhythm and normal rate, normal S1 and S2, no            murmur, no gallop, no rub, no click   Chest Wall:    No abnormalities observed   Abdomen:     Normal bowel sounds, no masses, no organomegaly, soft        non-tender, non-distended, no guarding, no rebound                tenderness   Rectal:     Deferred   Extremities:   Moves all extremities well, no edema, no cyanosis, no             redness right groin did not reveal of any evidence of any hematoma.   Pulses:   Pulses palpable and equal bilaterally   Skin:   No bleeding, bruising or rash   Lymph nodes:   No palpable adenopathy   Neurologic:   Cranial nerves 2 - 12 grossly intact, sensation intact, DTR       present and equal bilaterally       Discharge Disposition  Home or Self Care    Discharge Medications     Discharge Medications      Continue These Medications      Instructions Start Date   aspirin 81 MG EC tablet   81 mg, Oral, Daily      folic acid 400 MCG tablet  Commonly known as:  FOLVITE   400 mcg, Oral, Daily      levothyroxine 25 MCG tablet  Commonly known as:  SYNTHROID, LEVOTHROID   25 mcg, Oral, Daily      metoprolol tartrate 25 MG  tablet  Commonly known as:  LOPRESSOR   25 mg, Oral, 2 Times Daily      omeprazole 20 MG capsule  Commonly known as:  priLOSEC   20 mg, Oral, Daily      ranolazine 500 MG 12 hr tablet  Commonly known as:  RANEXA   500 mg, Oral, 2 Times Daily      rosuvastatin 10 MG tablet  Commonly known as:  CRESTOR   10 mg, Oral, Daily      ticagrelor 90 MG tablet tablet  Commonly known as:  BRILINTA   90 mg, Oral, 2 Times Daily      vitamin B-12 100 MCG tablet  Commonly known as:  CYANOCOBALAMIN   50 mcg, Oral, Daily             Discharge Diet: Cardiac diet    Activity at Discharge: As tolerated    Follow-up Appointments as scheduled  No future appointments.      Test Results Pending at Discharge       Patricio Barnard MD  10/01/19  7:35 PM    Time: Discharge 55 min     Dictated utilizing Dragon dictation.

## 2019-10-02 NOTE — PLAN OF CARE
Problem: Hypertensive Disease/Crisis (Arterial) (Adult)  Goal: Signs and Symptoms of Listed Potential Problems Will be Absent, Minimized or Managed (Hypertensive Disease/Crisis)  Outcome: Outcome(s) achieved Date Met: 10/01/19

## 2019-10-02 NOTE — PLAN OF CARE
Problem: Pain, Acute (Adult)  Goal: Identify Related Risk Factors and Signs and Symptoms  Outcome: Outcome(s) achieved Date Met: 10/01/19

## 2019-10-02 NOTE — PLAN OF CARE
Problem: Cardiac Catheterization (Diagnostic/Interventional) (Adult)  Goal: Signs and Symptoms of Listed Potential Problems Will be Absent, Minimized or Managed (Cardiac Catheterization)  Outcome: Outcome(s) achieved Date Met: 10/01/19

## 2019-10-02 NOTE — DISCHARGE INSTR - ACTIVITY
-Do not strain during bowel movements for the first 3 to 4 days after the procedure to prevent bleeding from the catheter insertion site.  -Avoid heavy lifting (more than 10 pounds) and pushing or pulling heavy objects for the first 5 to 7 days after the procedure.  -Do not participate in strenuous activities for 5 days after the procedure. This includes most sports - jogging, golfing, play tennis, and bowling.  -You may climb stairs if needed, but walk up and down the stairs more slowly than usual.  -Gradually increase your activities until you reach your normal activity level within one week after the procedure.

## 2019-10-02 NOTE — PLAN OF CARE
Problem: Patient Care Overview  Goal: Plan of Care Review  Outcome: Outcome(s) achieved Date Met: 10/01/19

## 2019-10-30 ENCOUNTER — DOCUMENTATION (OUTPATIENT)
Dept: CARDIAC REHAB | Facility: HOSPITAL | Age: 64
End: 2019-10-30

## 2020-07-09 DIAGNOSIS — R07.9 CHEST PAIN, UNSPECIFIED TYPE: Primary | ICD-10-CM

## 2020-08-13 ENCOUNTER — HOSPITAL ENCOUNTER (OUTPATIENT)
Dept: NUCLEAR MEDICINE | Facility: HOSPITAL | Age: 65
Discharge: HOME | End: 2020-08-13

## 2020-08-13 ENCOUNTER — HOSPITAL ENCOUNTER (OUTPATIENT)
Dept: CARDIOLOGY | Facility: HOSPITAL | Age: 65
Discharge: HOME | End: 2020-08-13

## 2020-08-13 DIAGNOSIS — I25.810 ATHEROSCLEROSIS OF AUTOLOGOUS VEIN CORONARY ARTERY BYPASS GRAFT, ANGINA PRESENCE UNSPECIFIED: ICD-10-CM

## 2020-08-13 DIAGNOSIS — R07.9 CHEST PAIN, UNSPECIFIED TYPE: ICD-10-CM

## 2020-08-13 DIAGNOSIS — Z98.61 HISTORY OF PTCA: ICD-10-CM

## 2020-08-13 DIAGNOSIS — I25.10 ATHEROSCLEROSIS OF NATIVE CORONARY ARTERY OF NATIVE HEART, ANGINA PRESENCE UNSPECIFIED: ICD-10-CM

## 2020-08-13 PROCEDURE — A9500 TC99M SESTAMIBI: HCPCS | Performed by: INTERNAL MEDICINE

## 2020-08-13 PROCEDURE — 93017 CV STRESS TEST TRACING ONLY: CPT

## 2020-08-13 PROCEDURE — 78452 HT MUSCLE IMAGE SPECT MULT: CPT

## 2020-08-13 PROCEDURE — 0 TECHNETIUM SESTAMIBI: Performed by: INTERNAL MEDICINE

## 2020-08-13 RX ADMIN — TECHNETIUM TC 99M SESTAMIBI 1 DOSE: 1 INJECTION INTRAVENOUS at 10:00

## 2020-08-13 RX ADMIN — TECHNETIUM TC 99M SESTAMIBI 1 DOSE: 1 INJECTION INTRAVENOUS at 08:49

## 2020-08-13 NOTE — H&P
Cardiology History and Physical Note        Patient Name: Lawrence Danielson  Age/Sex: 65 y.o. male  : 1955  MRN: [id number]    Date of Admission : 2020    Primary care Physician: Angel Kapadia MD    Reason for Admission: Chest pain history of atherosclerotic coronary artery disease exercise Cardiolite stress test.    Subjective:       Chief Complaint: Chest pain.    History of Present Illness:  Lawrence Danielson is a 65 y.o. male     Height of 5 feet 11 inches weight of 202 pounds body mass index of 28 with a past medical history significant for atherosclerotic coronary artery disease with last coronary angiogram done in 2019 with 100% occlusion of the saphenous vein graft to the diagonal branch with PTCA and stenting with deployment of a 3.5 mm x 8 mm and a 3.5 mm x 28 mm Xience Judy drug-eluting stent, 100% occlusion of the saphenous vein graft to the obtuse marginal branch and luminal irregularity in the left anterior descending artery with 100% occlusion of the diagonal branch and a nonfunctioning left internal mammary artery, the first coronary angiogram done in  which had revealed 100% occlusion of the distal circumflex artery with critical stenosis in the left anterior descending artery and right coronary artery with subsequent coronary artery bypass grafting with a LIMA to the LAD, saphenous vein graft to the obtuse marginal branch, saphenous vein graft to the first diagonal branch,  coronary angiogram done in 2015 which had revealed a patent saphenous vein graft to the diagonal branch with a nonfunctioning left internal mammary artery, 99% stenosis in the saphenous vein graft to the obtuse marginal branch, PTCA and stenting of the saphenous vein graft to the obtuse marginal branch with deployment of a 2.5 mm x 23 mm and a 2.5 mm x 12 mm expedition stent, last coronary angiogram done in 2019 which had revealed nonfunctioning left internal mammary artery with  nonobstructive disease in the left anterior descending artery and no evidence of any obstructive disease noted in the right coronary artery with a saphenous vein graft to the obtuse marginal branch with sluggish flow with 95 to 99% stenosis and a saphenous vein graft to the diagonal branch with 2 sequential 80 to 90% stenosis with successful Pronto thrombectomy of the saphenous vein graft to the obtuse marginal branch and PTCA and stenting of the saphenous vein graft to the obtuse marginal branch with deployment of a 2.75 mm x 38 mm Xience Judy drug-eluting stent x2 and a 2.75 mm x 33 mm Xience Judy drug-eluting stent and a 2.25 mm x 12 mm Xience Judy drug-eluting stent, with the diagonal branch stenosis which was treated medically, last coronary angiogram done in August 2019 which had revealed 100% occlusion of the saphenous vein graft to the obtuse marginal branch with Pronto thrombectomy of the saphenous vein graft to the obtuse marginal branch and PTCA and stenting of the saphenous vein graft to the obtuse marginal branch with deployment of a 2.75 mm x 38 mm Xience Judy drug-eluting stent x2 and a 2.75 mm x 33 mm Xience Judy drug-eluting stent and a 2.25 mm x 12 mm Xience Judy drug-eluting stent, 80 to 90% stenosis in the saphenous vein graft to the diagonal branch, arterial hypertension, hypertensive heart disease, hyperlipidemia, and strong family history for coronary artery disease.    Patient on April 1 has symptoms of substernal chest discomfort which was intermittent with exercise.  Patient subsequently had left-sided chest pain along with shoulder discomfort.  Patient was in [place] during that episode and did not seek any medical attention.  Patient description of the chest discomfort was of a different quality.  Patient had returned back from [place].  Patient now complains of having left-sided shoulder pain and chest tightness.  There is some chest pain with point tenderness in the back.   Patient on further questioning denies any associated diaphoresis.  Patient does have shortness of breath.  Patient has had symptoms of palpitation.    Patient resting electrocardiogram done reveals sinus rhythm at the rate of 79 bpm with nonspecific ST-T wave changes.    Patient on further questioning denies any fever chill productive cough.  Patient denies any lower extremity edema.  Patient denies any calf muscle tenderness.    Patient denies any episodes of any bleeding diastasis.  Patient since the last stent placement in October 2019 was started on Brilinta and has had on and off symptoms of shortness of breath.  Patient was informed patient symptoms of shortness of breath could be secondary to the Brilinta.  Patient denies any episodes of any bleeding diathesis.    Patient resting electrocardiogram done revealed sinus rhythm at the rate of 79 bpm with nonspecific ST-T wave changes.  Patient blood pressure 130/70 with a pulse of 79 respiration of 18.    Patient 10 point review of system except for stated in the history of present illness and negative.      Concurrent Medical History:  1.  Chest pain.  2.  Atherosclerotic coronary artery disease.  3.  PTCA and stenting of the saphenous vein graft to the diagonal branch with deployment of a 3.5 mm x 8 mm and a 3.5 mm x 38 mm Xience Judy drug-eluting stent.  4.  100% occlusion of the saphenous vein graft to the obtuse marginal branch.  5.  Luminal irregularity in the left anterior descending artery with 100% occlusion of the diagonal branch.  6.  Nonfunctioning left internal mammary artery.  7.  Coronary artery bypass grafting with:   a.   LIMA to the LAD.   b.   Saphenous vein graft to the obtuse marginal branch.   c.   Saphenous vein graft to the diagonal branch.  8.   Previous coronary angiogram done in  June 2019 which had revealed:  A.  Nonfunctioning left internal mammary artery with nonobstructive disease in the left anterior descending artery.  B .  No  evidence of any obstructive disease noted in the right coronary artery.  C.  Saphenous vein graft to the obtuse marginal branch with sluggish flow with sustained patency in the proximal stent with 95 to 99% stenosis.  D.  Saphenous vein graft to the diagonal branch with 2 sequential stenosis of up to 80 to 90%.  E.  Nonobstructive disease in the native left anterior descending artery and circumflex artery  F.  Pronto thrombectomy of the saphenous vein graft to the obtuse marginal branch.  G.  PTCA and stenting of the saphenous vein graft to the obtuse marginal branch with deployment of 2.75 mm x 38 mm Xience Judy drug-eluting stent x2, 2.75 mm x 33 mm Xience Judy drug-eluting stent and a 2.25 mm x 12 mm Xience Judy drug-eluting stent  9.  Coronary angiogram done in 2015 had revealed:       a.   Nonfunctioning left internal mammary artery with  nonobstructive disease in the left anterior descending artery.       b.   Critical stenosis in the diagonal branch with a patent saphenous vein graft to the diagonal branch.       c.   A 100% occlusion of the distal circumflex artery with a  saphenous vein graft to the obtuse marginal branch with 99% stenosis status post Pronto thrombectomy of the saphenous vein graft to the obtuse marginal branch with deployment of a 2.5 mm x 23 mm and a 2.5 mm x 12 mm expedition stent.       d.   No evidence of any obstructive disease noted in the right coronary artery.  10.   Arterial hypertension with hypertensive heart disease.  11.   Concentric left ventricular hypertrophy with diastolic dysfunction.  12.   Mild mitral, mild tricuspid regurgitation.  13.  Hyperlipidemia.  14.  Transient ischemic attack.  15.  Nonerosive gastritis.  16.  Gastrointestinal bleeding with subsequent stopping of the antiplatelet therapy Plavix.  17.  Strong family history for coronary artery disease.  18.  Hypothyroidism.        Past Surgical History:  1.   Coronary artery bypass grafting done in 2003  with:       a.   LIMA to the LAD.       b.   Saphenous vein graft to the obtuse marginal branch.       c.   Saphenous vein graft to the diagonal branch.  2.   Left inguinal herniorrhaphy.  3.   Esophagogastroduodenoscopy.  4.  Pronto thrombectomy of the saphenous vein graft to the obtuse marginal branch and stenting with deployment of a 2.5 mm x 23 mm and a 2.5 mm x 12 mm expedition stent 2015.  5.  Pronto thrombectomy of the saphenous vein graft to the obtuse marginal branch June 2019.  6.  PTCA and stenting of the saphenous vein graft to the obtuse marginal branch with deployment of 2.75 mm x 38 mm Xience Judy drug-eluting stent x2, 2.75 mm x 33 mm Xience Judy drug-eluting stent and a 2.25 mm x 12 mm Xience Judy drug-eluting stent June 2019.        SOCIAL HISTORY:  Previous history of tobacco abuse. Social alcohol intake. Patient is self-employed.     FAMILY HISTORY:  Significant for father who had premature atherosclerotic coronary artery disease.     CARDIAC RISK FACTORS:  1.   Male.  2.   Arterial hypertension.  3.   Hyperlipidemia.  4.   Family history for coronary artery disease.  5.   Previous history of tobacco abuse.        Allergies:  Allergies   Allergen Reactions   • Contrast Dye Anaphylaxis   • Shellfish-Derived Products Anaphylaxis       Home Medication::  Prior to Admission medications    Medication Sig Start Date End Date Taking? Authorizing Provider   aspirin 81 MG EC tablet Take 81 mg by mouth Daily.    Long Cope MD   folic acid (FOLVITE) 400 MCG tablet Take 400 mcg by mouth Daily.    Long Cope MD   levothyroxine (SYNTHROID, LEVOTHROID) 25 MCG tablet Take 25 mcg by mouth Daily.    Long Cope MD   metoprolol tartrate (LOPRESSOR) 25 MG tablet Take 25 mg by mouth 2 (Two) Times a Day.    Long Cope MD   omeprazole (priLOSEC) 20 MG capsule Take 20 mg by mouth Daily.    Long Cope MD   ranolazine (RANEXA) 500 MG 12 hr tablet Take 500 mg by  mouth 2 (Two) Times a Day.    Provider, MD Long   rosuvastatin (CRESTOR) 10 MG tablet Take 10 mg by mouth Daily.    Provider, MD Long   ticagrelor (BRILINTA) 90 MG tablet tablet Take 1 tablet by mouth 2 (Two) Times a Day. 6/29/19   Everton Bach MD   vitamin B-12 (CYANOCOBALAMIN) 100 MCG tablet Take 50 mcg by mouth Daily.    Provider, MD Long         Review of Systems   Constitutional: Negative for chills, fever and unexpected weight change.   HENT: Negative for hearing loss and nosebleeds.    Eyes: Negative for visual disturbance.   Respiratory: Positive for chest tightness and shortness of breath. Negative for cough and wheezing.    Cardiovascular: Positive for chest pain. Negative for palpitations and leg swelling.   Gastrointestinal: Negative for abdominal pain, blood in stool, constipation, diarrhea, nausea and vomiting.   Endocrine: Negative for cold intolerance, heat intolerance, polydipsia, polyphagia and polyuria.   Genitourinary: Negative for hematuria.   Musculoskeletal: Negative for joint swelling, myalgias and neck pain.   Skin: Negative for color change, rash and wound.   Neurological: Negative for dizziness, seizures, syncope, light-headedness, numbness and headaches.   Hematological: Does not bruise/bleed easily.         Objective:     Pulse of 79 regular respiration of 18 blood pressure 130/70 height of 5 feet 11 inches weight of 200 pounds with a body mask index 28.    Physical Exam   Constitutional: He is oriented to person, place, and time. He appears well-developed and well-nourished.   HENT:   Head: Normocephalic and atraumatic.   Left Ear: External ear normal.   Nose: Nose normal.   Mouth/Throat: Oropharynx is clear and moist.   Eyes: Pupils are equal, round, and reactive to light. Conjunctivae and EOM are normal. No scleral icterus.   Neck: Normal range of motion. Neck supple. No JVD present. No tracheal deviation present. No thyromegaly present.   Cardiovascular:    Regular S1 and S2 with a soft systolic murmur best heard at the apex.   Pulmonary/Chest: Breath sounds normal. No stridor.   Abdominal: Bowel sounds are normal.   Musculoskeletal: Normal range of motion.   Lymphadenopathy:     He has no cervical adenopathy.   Neurological: He is alert and oriented to person, place, and time. He has normal reflexes.   Skin: Skin is warm and dry.   Psychiatric: He has a normal mood and affect. His behavior is normal. Judgment and thought content normal.       Lab Review:           Invalid input(s): LABALBU, PROT                                    EKG:   ECG/EMG Results (last 24 hours)     ** No results found for the last 24 hours. **          Imaging:  Imaging Results (Last 24 Hours)     ** No results found for the last 24 hours. **          I personally viewed and interpreted the patient's EKG/Telemetry data.    Assessment:   1.  Chest pain.  2.  Atherosclerotic coronary artery disease with previous PTCA and stenting.  3.  Arterial hypertension.  4.  Hypertensive heart disease.          Plan:   1.  Chest pain.  Patient has history of documented atherosclerotic coronary artery disease with previous coronary artery bypass grafting done in 2003.  Patient last coronary angiogram was done in 2015 with a nonfunctioning left internal mammary artery and a saphenous vein graft to the obtuse marginal branch which had 99% stenosis.  Patient underwent successful PTCA and stenting and Pronto thrombectomy of the saphenous vein graft to the obtuse marginal branch.  Patient now has symptoms of chest pain which is similar in quality as the one which he had prior to the stent placement.  Patient had recurrent symptoms of chest pain and underwent coronary angiogram in June 2019 with Pronto thrombectomy  rehospitalization on August 12, 2019 which had revealed 100% occlusion of the saphenous vein graft to the obtuse marginal branch with successful Pronto thrombectomy and PTCA and stenting of the  saphenous vein graft to the obtuse marginal branch and PTCA and stenting of the saphenous vein graft to the obtuse marginal branch with 80 to 90% stenosis in the saphenous vein graft to the diagonal branch which was treated medically.  Patient was rehospitalized in September 2019 and underwent PTCA and stenting of the saphenous vein graft to the diagonal branch.  Patient now has recurrent symptoms of chest tightness.  Patient resting electrocardiogram did not reveal of any acute ST-T wave changes.  Patient has been recommended to undergo a Lexiscan Cardiolite stress test.  Risk-benefit treatment option for the Lexiscan Cardiolite stress test were discussed with the patient and an informed consent was obtained.    Plan was to proceed with a Lexiscan Cardiolite stress test on 8/13/2020.      2.  Arterial hypertension.  Patient blood pressure is currently well controlled and patient will be continued on the present dose of the metoprolol.  Patient has not complained of having any symptoms of headache.  Patient has been counseled to decrease her salt intake.      3.  Hypertensive heart disease nonrheumatic mild mitral and nonrheumatic mild tricuspid regurgitation.  Clinically at the present time patient not in congestive heart failure.  Patient at the present time would be continued on the present dose of the metoprolol.  If the patient blood pressure starts rising patient would be started on losartan.      4.  Hyperlipidemia.  Patient has been recommended to continue with the present dose of the Crestor.  Patient has been counseled on low-fat low-cholesterol diet and to continue with the present dose of the Crestor.  Patient is to undergo a lipid profile including liver function test evaluation.      5.  Hypothyroidism.  Patient is currently on thyroid supplement.  Patient is currently on thyroid supplement.      6.  Gastroesophageal reflux disease.  Patient is currently on Prilosec.    7.  Shortness of breath.   Patient symptoms of shortness of breath since the last percutaneous intervention could be secondary to the Brilinta.  Patient has been recommended to stop the Brilinta and would start the patient on Plavix 75 mg daily.     Above plan of management were discussed with the patient.    Was to proceed with a Lexiscan Cardiolite stress test on 8/13/2020.      Time: time spent in face-to-face evaluation of greater than 55 minutes and interacting and formulating examining and discussing the plan with the patient with 50% of greater time spent in face-to-face interaction.    Electronically signed by Patricio Barnard MD, 08/13/20, 7:59 AM.      Dictated utilizing Dragon dictation.

## 2020-08-14 ENCOUNTER — PREP FOR SURGERY (OUTPATIENT)
Dept: OTHER | Facility: HOSPITAL | Age: 65
End: 2020-08-14

## 2020-08-14 DIAGNOSIS — Z01.818 PREOP TESTING: Primary | ICD-10-CM

## 2020-08-14 DIAGNOSIS — R07.9 CHEST PAIN, UNSPECIFIED TYPE: Primary | ICD-10-CM

## 2020-08-14 RX ORDER — SODIUM CHLORIDE 0.9 % (FLUSH) 0.9 %
10 SYRINGE (ML) INJECTION AS NEEDED
Status: CANCELLED | OUTPATIENT
Start: 2020-08-20

## 2020-08-14 RX ORDER — SODIUM CHLORIDE 0.9 % (FLUSH) 0.9 %
3 SYRINGE (ML) INJECTION EVERY 12 HOURS SCHEDULED
Status: CANCELLED | OUTPATIENT
Start: 2020-08-20

## 2020-08-14 RX ORDER — SODIUM CHLORIDE 9 MG/ML
75 INJECTION, SOLUTION INTRAVENOUS CONTINUOUS
Status: CANCELLED | OUTPATIENT
Start: 2020-08-20

## 2020-08-17 PROCEDURE — C9803 HOPD COVID-19 SPEC COLLECT: HCPCS | Performed by: INTERNAL MEDICINE

## 2020-08-17 PROCEDURE — U0003 INFECTIOUS AGENT DETECTION BY NUCLEIC ACID (DNA OR RNA); SEVERE ACUTE RESPIRATORY SYNDROME CORONAVIRUS 2 (SARS-COV-2) (CORONAVIRUS DISEASE [COVID-19]), AMPLIFIED PROBE TECHNIQUE, MAKING USE OF HIGH THROUGHPUT TECHNOLOGIES AS DESCRIBED BY CMS-2020-01-R: HCPCS | Performed by: INTERNAL MEDICINE

## 2020-08-18 LAB
COVID LABCORP PRIORITY: NORMAL
SARS-COV-2 RNA RESP QL NAA+PROBE: NOT DETECTED

## 2020-08-20 ENCOUNTER — HOSPITAL ENCOUNTER (OUTPATIENT)
Facility: HOSPITAL | Age: 65
Setting detail: HOSPITAL OUTPATIENT SURGERY
Discharge: HOME | End: 2020-08-20
Attending: INTERNAL MEDICINE | Admitting: INTERNAL MEDICINE

## 2020-08-20 VITALS
HEART RATE: 75 BPM | BODY MASS INDEX: 28.15 KG/M2 | RESPIRATION RATE: 18 BRPM | HEIGHT: 70 IN | WEIGHT: 196.65 LBS | DIASTOLIC BLOOD PRESSURE: 62 MMHG | OXYGEN SATURATION: 97 % | TEMPERATURE: 96.7 F | SYSTOLIC BLOOD PRESSURE: 110 MMHG

## 2020-08-20 DIAGNOSIS — R07.9 CHEST PAIN, UNSPECIFIED TYPE: ICD-10-CM

## 2020-08-20 LAB
ANION GAP SERPL CALCULATED.3IONS-SCNC: 11 MMOL/L (ref 5–15)
BASOPHILS # BLD AUTO: 0.06 10*3/MM3 (ref 0–0.2)
BASOPHILS NFR BLD AUTO: 1 % (ref 0–1.5)
BUN SERPL-MCNC: 13 MG/DL (ref 8–23)
BUN/CREAT SERPL: 12.9 (ref 7–25)
CALCIUM SPEC-SCNC: 9.2 MG/DL (ref 8.6–10.5)
CHLORIDE SERPL-SCNC: 100 MMOL/L (ref 98–107)
CO2 SERPL-SCNC: 28 MMOL/L (ref 22–29)
CREAT SERPL-MCNC: 1.01 MG/DL (ref 0.76–1.27)
DEPRECATED RDW RBC AUTO: 40.3 FL (ref 37–54)
EOSINOPHIL # BLD AUTO: 0.25 10*3/MM3 (ref 0–0.4)
EOSINOPHIL NFR BLD AUTO: 4.1 % (ref 0.3–6.2)
ERYTHROCYTE [DISTWIDTH] IN BLOOD BY AUTOMATED COUNT: 11.8 % (ref 12.3–15.4)
GFR SERPL CREATININE-BSD FRML MDRD: 74 ML/MIN/1.73
GLUCOSE SERPL-MCNC: 96 MG/DL (ref 65–99)
HCT VFR BLD AUTO: 46.3 % (ref 37.5–51)
HGB BLD-MCNC: 16.2 G/DL (ref 13–17.7)
IMM GRANULOCYTES # BLD AUTO: 0.03 10*3/MM3 (ref 0–0.05)
IMM GRANULOCYTES NFR BLD AUTO: 0.5 % (ref 0–0.5)
INR PPP: 0.93 (ref 0.8–1.2)
LYMPHOCYTES # BLD AUTO: 1.92 10*3/MM3 (ref 0.7–3.1)
LYMPHOCYTES NFR BLD AUTO: 31.1 % (ref 19.6–45.3)
MCH RBC QN AUTO: 32.8 PG (ref 26.6–33)
MCHC RBC AUTO-ENTMCNC: 35 G/DL (ref 31.5–35.7)
MCV RBC AUTO: 93.7 FL (ref 79–97)
MONOCYTES # BLD AUTO: 0.68 10*3/MM3 (ref 0.1–0.9)
MONOCYTES NFR BLD AUTO: 11 % (ref 5–12)
NEUTROPHILS NFR BLD AUTO: 3.23 10*3/MM3 (ref 1.7–7)
NEUTROPHILS NFR BLD AUTO: 52.3 % (ref 42.7–76)
NRBC BLD AUTO-RTO: 0 /100 WBC (ref 0–0.2)
PLATELET # BLD AUTO: 212 10*3/MM3 (ref 140–450)
PMV BLD AUTO: 10 FL (ref 6–12)
POTASSIUM SERPL-SCNC: 4.3 MMOL/L (ref 3.5–5.2)
PROTHROMBIN TIME: 12.8 SECONDS (ref 11.1–15.3)
RBC # BLD AUTO: 4.94 10*6/MM3 (ref 4.14–5.8)
SODIUM SERPL-SCNC: 139 MMOL/L (ref 136–145)
WBC # BLD AUTO: 6.17 10*3/MM3 (ref 3.4–10.8)

## 2020-08-20 PROCEDURE — 85025 COMPLETE CBC W/AUTO DIFF WBC: CPT | Performed by: INTERNAL MEDICINE

## 2020-08-20 PROCEDURE — 93459 L HRT ART/GRFT ANGIO: CPT | Performed by: INTERNAL MEDICINE

## 2020-08-20 PROCEDURE — C1760 CLOSURE DEV, VASC: HCPCS | Performed by: INTERNAL MEDICINE

## 2020-08-20 PROCEDURE — C1894 INTRO/SHEATH, NON-LASER: HCPCS | Performed by: INTERNAL MEDICINE

## 2020-08-20 PROCEDURE — 85610 PROTHROMBIN TIME: CPT | Performed by: INTERNAL MEDICINE

## 2020-08-20 PROCEDURE — 0 IOPAMIDOL PER 1 ML: Performed by: INTERNAL MEDICINE

## 2020-08-20 PROCEDURE — 25010000002 DIPHENHYDRAMINE PER 50 MG: Performed by: INTERNAL MEDICINE

## 2020-08-20 PROCEDURE — 25010000002 FENTANYL CITRATE (PF) 100 MCG/2ML SOLUTION: Performed by: INTERNAL MEDICINE

## 2020-08-20 PROCEDURE — 25010000002 MIDAZOLAM PER 1 MG: Performed by: INTERNAL MEDICINE

## 2020-08-20 PROCEDURE — C1769 GUIDE WIRE: HCPCS | Performed by: INTERNAL MEDICINE

## 2020-08-20 PROCEDURE — 80048 BASIC METABOLIC PNL TOTAL CA: CPT | Performed by: INTERNAL MEDICINE

## 2020-08-20 PROCEDURE — 25010000002 HYDROCORTISONE SODIUM SUCCINATE 100 MG RECONSTITUTED SOLUTION: Performed by: INTERNAL MEDICINE

## 2020-08-20 RX ORDER — LIDOCAINE HYDROCHLORIDE 20 MG/ML
INJECTION, SOLUTION INFILTRATION; PERINEURAL AS NEEDED
Status: DISCONTINUED | OUTPATIENT
Start: 2020-08-20 | End: 2020-08-20 | Stop reason: HOSPADM

## 2020-08-20 RX ORDER — SODIUM CHLORIDE 0.9 % (FLUSH) 0.9 %
3 SYRINGE (ML) INJECTION EVERY 12 HOURS SCHEDULED
Status: DISCONTINUED | OUTPATIENT
Start: 2020-08-20 | End: 2020-08-20 | Stop reason: HOSPADM

## 2020-08-20 RX ORDER — SODIUM CHLORIDE 0.9 % (FLUSH) 0.9 %
10 SYRINGE (ML) INJECTION AS NEEDED
Status: DISCONTINUED | OUTPATIENT
Start: 2020-08-20 | End: 2020-08-20 | Stop reason: HOSPADM

## 2020-08-20 RX ORDER — SODIUM CHLORIDE 9 MG/ML
100 INJECTION, SOLUTION INTRAVENOUS CONTINUOUS
Status: DISCONTINUED | OUTPATIENT
Start: 2020-08-20 | End: 2020-08-20 | Stop reason: HOSPADM

## 2020-08-20 RX ORDER — CLOPIDOGREL BISULFATE 75 MG/1
75 TABLET ORAL DAILY
COMMUNITY

## 2020-08-20 RX ORDER — DIPHENHYDRAMINE HYDROCHLORIDE 50 MG/ML
25 INJECTION INTRAMUSCULAR; INTRAVENOUS ONCE
Status: COMPLETED | OUTPATIENT
Start: 2020-08-20 | End: 2020-08-20

## 2020-08-20 RX ORDER — SODIUM CHLORIDE 9 MG/ML
75 INJECTION, SOLUTION INTRAVENOUS CONTINUOUS
Status: DISCONTINUED | OUTPATIENT
Start: 2020-08-20 | End: 2020-08-20 | Stop reason: HOSPADM

## 2020-08-20 RX ORDER — FENTANYL CITRATE 50 UG/ML
INJECTION, SOLUTION INTRAMUSCULAR; INTRAVENOUS AS NEEDED
Status: DISCONTINUED | OUTPATIENT
Start: 2020-08-20 | End: 2020-08-20 | Stop reason: HOSPADM

## 2020-08-20 RX ORDER — RANOLAZINE 500 MG/1
1000 TABLET, EXTENDED RELEASE ORAL 2 TIMES DAILY
Qty: 180 TABLET | Refills: 12 | Status: SHIPPED | OUTPATIENT
Start: 2020-08-20

## 2020-08-20 RX ORDER — ACETAMINOPHEN 325 MG/1
650 TABLET ORAL EVERY 4 HOURS PRN
Status: DISCONTINUED | OUTPATIENT
Start: 2020-08-20 | End: 2020-08-20 | Stop reason: HOSPADM

## 2020-08-20 RX ORDER — MIDAZOLAM HYDROCHLORIDE 1 MG/ML
INJECTION INTRAMUSCULAR; INTRAVENOUS AS NEEDED
Status: DISCONTINUED | OUTPATIENT
Start: 2020-08-20 | End: 2020-08-20 | Stop reason: HOSPADM

## 2020-08-20 RX ADMIN — HYDROCORTISONE SODIUM SUCCINATE 100 MG: 100 INJECTION, POWDER, FOR SOLUTION INTRAMUSCULAR; INTRAVENOUS at 11:47

## 2020-08-20 RX ADMIN — SODIUM CHLORIDE 75 ML/HR: 9 INJECTION, SOLUTION INTRAVENOUS at 10:47

## 2020-08-20 RX ADMIN — DIPHENHYDRAMINE HYDROCHLORIDE 25 MG: 50 INJECTION INTRAMUSCULAR; INTRAVENOUS at 11:48

## 2020-08-20 NOTE — H&P
Cardiology History and Physical Note        Patient Name: Lawrence Danielson  Age/Sex: 65 y.o. male  : 1955  MRN: [id number]    Date of Admission : 2020    Primary care Physician: Angel Kapadia MD    Reason for Admission: Positive stress test with history of atherosclerotic coronary artery disease left heart catheterization.    Subjective:       Chief Complaint: Chest pain.    History of Present Illness:  Lawrence Danielson is a 65 y.o. male     Height of 5 feet 11 inches weight of 202 pounds body mass index of 28 with a past medical history significant for atherosclerotic coronary artery disease with last coronary angiogram done in 2019 with 100% occlusion of the saphenous vein graft to the diagonal branch with PTCA and stenting with deployment of a 3.5 mm x 8 mm and a 3.5 mm x 28 mm Xience Judy drug-eluting stent, 100% occlusion of the saphenous vein graft to the obtuse marginal branch and luminal irregularity in the left anterior descending artery with 100% occlusion of the diagonal branch and a nonfunctioning left internal mammary artery, the first coronary angiogram done in  which had revealed 100% occlusion of the distal circumflex artery with critical stenosis in the left anterior descending artery and right coronary artery with subsequent coronary artery bypass grafting with a LIMA to the LAD, saphenous vein graft to the obtuse marginal branch, saphenous vein graft to the first diagonal branch,  coronary angiogram done in 2015 which had revealed a patent saphenous vein graft to the diagonal branch with a nonfunctioning left internal mammary artery, 99% stenosis in the saphenous vein graft to the obtuse marginal branch, PTCA and stenting of the saphenous vein graft to the obtuse marginal branch with deployment of a 2.5 mm x 23 mm and a 2.5 mm x 12 mm expedition stent, last coronary angiogram done in 2019 which had revealed nonfunctioning left internal mammary  artery with nonobstructive disease in the left anterior descending artery and no evidence of any obstructive disease noted in the right coronary artery with a saphenous vein graft to the obtuse marginal branch with sluggish flow with 95 to 99% stenosis and a saphenous vein graft to the diagonal branch with 2 sequential 80 to 90% stenosis with successful Pronto thrombectomy of the saphenous vein graft to the obtuse marginal branch and PTCA and stenting of the saphenous vein graft to the obtuse marginal branch with deployment of a 2.75 mm x 38 mm Xience Judy drug-eluting stent x2 and a 2.75 mm x 33 mm Xience Judy drug-eluting stent and a 2.25 mm x 12 mm Xience Judy drug-eluting stent, with the diagonal branch stenosis which was treated medically, last coronary angiogram done in August 2019 which had revealed 100% occlusion of the saphenous vein graft to the obtuse marginal branch with Pronto thrombectomy of the saphenous vein graft to the obtuse marginal branch and PTCA and stenting of the saphenous vein graft to the obtuse marginal branch with deployment of a 2.75 mm x 38 mm Xience Judy drug-eluting stent x2 and a 2.75 mm x 33 mm Xience Juyd drug-eluting stent and a 2.25 mm x 12 mm Xience Judy drug-eluting stent, 80 to 90% stenosis in the saphenous vein graft to the diagonal branch, arterial hypertension, hypertensive heart disease, hyperlipidemia, and strong family history for coronary artery disease.    Patient on April 1 has symptoms of substernal chest discomfort which was intermittent with exercise.  Patient subsequently had left-sided chest pain along with shoulder discomfort.  Patient was in [place] during that episode and did not seek any medical attention.  Patient description of the chest discomfort was of a different quality.  Patient had returned back from [place].  Patient now complains of having left-sided shoulder pain and chest tightness.  There is some chest pain with point tenderness in  the back.  Patient on further questioning denies any associated diaphoresis.  Patient does have shortness of breath.  Patient has had symptoms of palpitation.    Patient resting electrocardiogram done reveals sinus rhythm at the rate of 79 bpm with nonspecific ST-T wave changes.    Patient on further questioning denies any fever chill productive cough.  Patient denies any lower extremity edema.  Patient denies any calf muscle tenderness.    Patient denies any episodes of any bleeding diastasis.  Patient since the last stent placement in October 2019 was started on Brilinta and has had on and off symptoms of shortness of breath.  Patient was informed patient symptoms of shortness of breath could be secondary to the Brilinta.  Patient denies any episodes of any bleeding diathesis.    Patient underwent an exercise Cardiolite stress test.  Patient Cardiolite stress test was suggestive of reversible ischemia in the inferior anterior and lateral wall and a high risk study.  Due to the patient ongoing symptoms of chest pain with positive stress test patient was recommended a coronary angiogram.        Patient resting electrocardiogram done revealed sinus rhythm at the rate of 79 bpm with nonspecific ST-T wave changes.  Patient blood pressure 130/70 with a pulse of 79 respiration of 18.    Patient 10 point review of system except for stated in the history of present illness and negative.      Concurrent Medical History:  1.  Chest pain with positive nuclear Cardiolite stress test.  2.  Atherosclerotic coronary artery disease.  3.  PTCA and stenting of the saphenous vein graft to the diagonal branch with deployment of a 3.5 mm x 8 mm and a 3.5 mm x 38 mm Xience Judy drug-eluting stent.  4.  100% occlusion of the saphenous vein graft to the obtuse marginal branch.  5.  Luminal irregularity in the left anterior descending artery with 100% occlusion of the diagonal branch.  6.  Nonfunctioning left internal mammary  artery.  7.  Coronary artery bypass grafting with:   a.   LIMA to the LAD.   b.   Saphenous vein graft to the obtuse marginal branch.   c.   Saphenous vein graft to the diagonal branch.  8.   Previous coronary angiogram done in  June 2019 which had revealed:  A.  Nonfunctioning left internal mammary artery with nonobstructive disease in the left anterior descending artery.  B .  No evidence of any obstructive disease noted in the right coronary artery.  C.  Saphenous vein graft to the obtuse marginal branch with sluggish flow with sustained patency in the proximal stent with 95 to 99% stenosis.  D.  Saphenous vein graft to the diagonal branch with 2 sequential stenosis of up to 80 to 90%.  E.  Nonobstructive disease in the native left anterior descending artery and circumflex artery  F.  Pronto thrombectomy of the saphenous vein graft to the obtuse marginal branch.  G.  PTCA and stenting of the saphenous vein graft to the obtuse marginal branch with deployment of 2.75 mm x 38 mm Xience Judy drug-eluting stent x2, 2.75 mm x 33 mm Xience Judy drug-eluting stent and a 2.25 mm x 12 mm Xience Judy drug-eluting stent  9.  Coronary angiogram done in 2015 had revealed:       a.   Nonfunctioning left internal mammary artery with  nonobstructive disease in the left anterior descending artery.       b.   Critical stenosis in the diagonal branch with a patent saphenous vein graft to the diagonal branch.       c.   A 100% occlusion of the distal circumflex artery with a  saphenous vein graft to the obtuse marginal branch with 99% stenosis status post Pronto thrombectomy of the saphenous vein graft to the obtuse marginal branch with deployment of a 2.5 mm x 23 mm and a 2.5 mm x 12 mm expedition stent.       d.   No evidence of any obstructive disease noted in the right coronary artery.  10.   Arterial hypertension with hypertensive heart disease.  11.   Concentric left ventricular hypertrophy with diastolic  dysfunction.  12.   Mild mitral, mild tricuspid regurgitation.  13.  Hyperlipidemia.  14.  Transient ischemic attack.  15.  Nonerosive gastritis.  16.  Gastrointestinal bleeding with subsequent stopping of the antiplatelet therapy Plavix.  17.  Strong family history for coronary artery disease.  18.  Hypothyroidism.        Past Surgical History:  1.   Coronary artery bypass grafting done in 2003 with:       a.   LIMA to the LAD.       b.   Saphenous vein graft to the obtuse marginal branch.       c.   Saphenous vein graft to the diagonal branch.  2.   Left inguinal herniorrhaphy.  3.   Esophagogastroduodenoscopy.  4.  Pronto thrombectomy of the saphenous vein graft to the obtuse marginal branch and stenting with deployment of a 2.5 mm x 23 mm and a 2.5 mm x 12 mm expedition stent 2015.  5.  Pronto thrombectomy of the saphenous vein graft to the obtuse marginal branch June 2019.  6.  PTCA and stenting of the saphenous vein graft to the obtuse marginal branch with deployment of 2.75 mm x 38 mm Xience Judy drug-eluting stent x2, 2.75 mm x 33 mm Xience Judy drug-eluting stent and a 2.25 mm x 12 mm Xience Judy drug-eluting stent June 2019.        SOCIAL HISTORY:  Previous history of tobacco abuse. Social alcohol intake. Patient is self-employed.     FAMILY HISTORY:  Significant for father who had premature atherosclerotic coronary artery disease.     CARDIAC RISK FACTORS:  1.   Male.  2.   Arterial hypertension.  3.   Hyperlipidemia.  4.   Family history for coronary artery disease.  5.   Previous history of tobacco abuse.        Allergies:  Allergies   Allergen Reactions   • Contrast Dye Anaphylaxis   • Shellfish-Derived Products Anaphylaxis       Home Medication::  Prior to Admission medications    Medication Sig Start Date End Date Taking? Authorizing Provider   aspirin 81 MG EC tablet Take 81 mg by mouth Daily.    Provider, MD Long   folic acid (FOLVITE) 400 MCG tablet Take 400 mcg by mouth Daily.     Long Cope MD   levothyroxine (SYNTHROID, LEVOTHROID) 25 MCG tablet Take 25 mcg by mouth Daily.    Long Cope MD   metoprolol tartrate (LOPRESSOR) 25 MG tablet Take 25 mg by mouth 2 (Two) Times a Day.    Long Cope MD   omeprazole (priLOSEC) 20 MG capsule Take 20 mg by mouth Daily.    Long Cope MD   ranolazine (RANEXA) 500 MG 12 hr tablet Take 500 mg by mouth 2 (Two) Times a Day.    Long Cope MD   rosuvastatin (CRESTOR) 10 MG tablet Take 10 mg by mouth Daily.    Long Cope MD   ticagrelor (BRILINTA) 90 MG tablet tablet Take 1 tablet by mouth 2 (Two) Times a Day. 6/29/19   Everton Bach MD   vitamin B-12 (CYANOCOBALAMIN) 100 MCG tablet Take 50 mcg by mouth Daily.    Long Cope MD         Review of Systems   Constitutional: Negative for chills, fever and unexpected weight change.   HENT: Negative for hearing loss and nosebleeds.    Eyes: Negative for visual disturbance.   Respiratory: Positive for chest tightness and shortness of breath. Negative for cough and wheezing.    Cardiovascular: Positive for chest pain. Negative for palpitations and leg swelling.   Gastrointestinal: Negative for abdominal pain, blood in stool, constipation, diarrhea, nausea and vomiting.   Endocrine: Negative for cold intolerance, heat intolerance, polydipsia, polyphagia and polyuria.   Genitourinary: Negative for hematuria.   Musculoskeletal: Negative for joint swelling, myalgias and neck pain.   Skin: Negative for color change, rash and wound.   Neurological: Negative for dizziness, seizures, syncope, light-headedness, numbness and headaches.   Hematological: Does not bruise/bleed easily.         Objective:     Pulse of 79 regular respiration of 18 blood pressure 130/70 height of 5 feet 11 inches weight of 200 pounds with a body mask index 28.    Physical Exam   Constitutional: He is oriented to person, place, and time. He appears well-developed and  well-nourished.   HENT:   Head: Normocephalic and atraumatic.   Left Ear: External ear normal.   Nose: Nose normal.   Mouth/Throat: Oropharynx is clear and moist.   Eyes: Pupils are equal, round, and reactive to light. Conjunctivae and EOM are normal. No scleral icterus.   Neck: Normal range of motion. Neck supple. No JVD present. No tracheal deviation present. No thyromegaly present.   Cardiovascular:   Regular S1 and S2 with a soft systolic murmur best heard at the apex.   Pulmonary/Chest: Breath sounds normal. No stridor.   Abdominal: Bowel sounds are normal.   Musculoskeletal: Normal range of motion.   Lymphadenopathy:     He has no cervical adenopathy.   Neurological: He is alert and oriented to person, place, and time. He has normal reflexes.   Skin: Skin is warm and dry.   Psychiatric: He has a normal mood and affect. His behavior is normal. Judgment and thought content normal.       Lab Review:           Invalid input(s): LABALBU, PROT                                    EKG:   ECG/EMG Results (last 24 hours)     ** No results found for the last 24 hours. **          Imaging:  Imaging Results (Last 24 Hours)     ** No results found for the last 24 hours. **          I personally viewed and interpreted the patient's EKG/Telemetry data.    Assessment:   1.  Chest pain.  2.  Atherosclerotic coronary artery disease with previous PTCA and stenting.  3.  Arterial hypertension.  4.  Hypertensive heart disease.          Plan:   1.  Chest pain.  Patient has history of documented atherosclerotic coronary artery disease with previous coronary artery bypass grafting done in 2003.  Patient last coronary angiogram was done in 2015 with a nonfunctioning left internal mammary artery and a saphenous vein graft to the obtuse marginal branch which had 99% stenosis.  Patient underwent successful PTCA and stenting and Pronto thrombectomy of the saphenous vein graft to the obtuse marginal branch.  Patient now has symptoms of chest  pain which is similar in quality as the one which he had prior to the stent placement.  Patient had recurrent symptoms of chest pain and underwent coronary angiogram in June 2019 with Pronto thrombectomy  rehospitalization on August 12, 2019 which had revealed 100% occlusion of the saphenous vein graft to the obtuse marginal branch with successful Pronto thrombectomy and PTCA and stenting of the saphenous vein graft to the obtuse marginal branch and PTCA and stenting of the saphenous vein graft to the obtuse marginal branch with 80 to 90% stenosis in the saphenous vein graft to the diagonal branch which was treated medically.  Patient was rehospitalized in September 2019 and underwent PTCA and stenting of the saphenous vein graft to the diagonal branch.  Patient now has recurrent symptoms of chest tightness.  Patient resting electrocardiogram did not reveal of any acute ST-T wave changes.  Patient underwent an exercise Cardiolite stress test.  Patient Cardiolite stress test was suggestive of reversible ischemia in the inferior anterior and lateral wall and a high risk study.  Due to the patient ongoing symptoms of chest pain with positive stress test patient was recommended a coronary angiogram.   Patient has documented history of atherosclerotic coronary artery disease with symptoms of substernal chest pain suggestive of crescendo unstable angina pectoris.  Patient has been explained risk-benefit treatment option for a coronary angiogram and an informed consent was obtained from the patient for the coronary angiogram.  Patient Mallampati score was II patient ASA classification was II.  Patient would undergo IV hydration and will check the renal function prior to the coronary angiogram.    Plan was to proceed with a left heart catheterization on 8/20/2020.    2.  Arterial hypertension.  Patient blood pressure is currently well controlled and patient will be continued on the present dose of the metoprolol.  Patient  has not complained of having any symptoms of headache.  Patient has been counseled to decrease her salt intake.      3.  Hypertensive heart disease nonrheumatic mild mitral and nonrheumatic mild tricuspid regurgitation.  Clinically at the present time patient not in congestive heart failure.  Patient at the present time would be continued on the present dose of the metoprolol.  If the patient blood pressure starts rising patient would be started on losartan.      4.  Hyperlipidemia.  Patient has been recommended to continue with the present dose of the Crestor.  Patient has been counseled on low-fat low-cholesterol diet and to continue with the present dose of the Crestor.  Patient is to undergo a lipid profile including liver function test evaluation.      5.  Hypothyroidism.  Patient is currently on thyroid supplement.  Patient is currently on thyroid supplement.      6.  Gastroesophageal reflux disease.  Patient is currently on Prilosec.    7.  Shortness of breath.  Patient symptoms of shortness of breath since the last percutaneous intervention could be secondary to the Brilinta.  Patient has been recommended to stop the Brilinta and would start the patient on Plavix 75 mg daily.     Above plan of management were discussed with the patient.    Was to proceed with a Lexiscan Cardiolite stress test on 8/13/2020.      Time: time spent in face-to-face evaluation of greater than 55 minutes and interacting and formulating examining and discussing the plan with the patient with 50% of greater time spent in face-to-face interaction.    Electronically signed by Patricio Barnard MD, 08/13/20, 7:59 AM.      Dictated utilizing Dragon dictation.

## 2020-08-20 NOTE — H&P
Cardiology History and Physical Note        Patient Name: Lawrence Danielson  Age/Sex: 65 y.o. male  : 1955  MRN: [id number]    Date of Admission : 2020    Primary care Physician: Angel Kapadia MD    Reason for Admission: Chest pain positive nuclear Cardiolite stress test left heart catheterization    Subjective:       Chief Complaint: Chest pain        History of Present Illness:  Lawrence Danielson is a 65 y.o. male     Height of 5 feet 11 inches weight of 202 pounds body mass index of 28 with a past medical history significant for atherosclerotic coronary artery disease with last coronary angiogram done in 2019 with 100% occlusion of the saphenous vein graft to the diagonal branch with PTCA and stenting with deployment of a 3.5 mm x 8 mm and a 3.5 mm x 28 mm Xience Judy drug-eluting stent, 100% occlusion of the saphenous vein graft to the obtuse marginal branch and luminal irregularity in the left anterior descending artery with 100% occlusion of the diagonal branch and a nonfunctioning left internal mammary artery, the first coronary angiogram done in  which had revealed 100% occlusion of the distal circumflex artery with critical stenosis in the left anterior descending artery and right coronary artery with subsequent coronary artery bypass grafting with a LIMA to the LAD, saphenous vein graft to the obtuse marginal branch, saphenous vein graft to the first diagonal branch,  coronary angiogram done in 2015 which had revealed a patent saphenous vein graft to the diagonal branch with a nonfunctioning left internal mammary artery, 99% stenosis in the saphenous vein graft to the obtuse marginal branch, PTCA and stenting of the saphenous vein graft to the obtuse marginal branch with deployment of a 2.5 mm x 23 mm and a 2.5 mm x 12 mm expedition stent, last coronary angiogram done in 2019 which had revealed nonfunctioning left internal mammary artery with nonobstructive  disease in the left anterior descending artery and no evidence of any obstructive disease noted in the right coronary artery with a saphenous vein graft to the obtuse marginal branch with sluggish flow with 95 to 99% stenosis and a saphenous vein graft to the diagonal branch with 2 sequential 80 to 90% stenosis with successful Pronto thrombectomy of the saphenous vein graft to the obtuse marginal branch and PTCA and stenting of the saphenous vein graft to the obtuse marginal branch with deployment of a 2.75 mm x 38 mm Xience Judy drug-eluting stent x2 and a 2.75 mm x 33 mm Xience Judy drug-eluting stent and a 2.25 mm x 12 mm Xience Judy drug-eluting stent, with the diagonal branch stenosis which was treated medically, last coronary angiogram done in August 2019 which had revealed 100% occlusion of the saphenous vein graft to the obtuse marginal branch with Pronto thrombectomy of the saphenous vein graft to the obtuse marginal branch and PTCA and stenting of the saphenous vein graft to the obtuse marginal branch with deployment of a 2.75 mm x 38 mm Xience Judy drug-eluting stent x2 and a 2.75 mm x 33 mm Xience Judy drug-eluting stent and a 2.25 mm x 12 mm Xience Judy drug-eluting stent, 80 to 90% stenosis in the saphenous vein graft to the diagonal branch, arterial hypertension, hypertensive heart disease, hyperlipidemia, and strong family history for coronary artery disease.     Patient on April 1 has symptoms of substernal chest discomfort which was intermittent with exercise.  Patient subsequently had left-sided chest pain along with shoulder discomfort.  Patient was in [place] during that episode and did not seek any medical attention.  Patient description of the chest discomfort was of a different quality.  Patient had returned back from [place].  Patient now complains of having left-sided shoulder pain and chest tightness.  There is some chest pain with point tenderness in the back.  Patient on  further questioning denies any associated diaphoresis.  Patient does have shortness of breath.  Patient has had symptoms of palpitation.     Patient resting electrocardiogram done reveals sinus rhythm at the rate of 79 bpm with nonspecific ST-T wave changes.     Patient on further questioning denies any fever chill productive cough.  Patient denies any lower extremity edema.  Patient denies any calf muscle tenderness.     Patient denies any episodes of any bleeding diastasis.  Patient since the last stent placement in October 2019 was started on Brilinta and has had on and off symptoms of shortness of breath.  Patient was informed patient symptoms of shortness of breath could be secondary to the Brilinta.  Patient denies any episodes of any bleeding diathesis.     Patient underwent an exercise Cardiolite stress test.  Patient Cardiolite stress test was suggestive of reversible ischemia in the inferior anterior and lateral wall and a high risk study.  Due to the patient ongoing symptoms of chest pain with positive stress test patient was recommended a coronary angiogram.          Patient resting electrocardiogram done revealed sinus rhythm at the rate of 79 bpm with nonspecific ST-T wave changes.  Patient blood pressure 130/70 with a pulse of 79 respiration of 18.     Patient 10 point review of system except for stated in the history of present illness and negative.        Concurrent Medical History:  1.  Chest pain with positive nuclear Cardiolite stress test.  2.  Atherosclerotic coronary artery disease.  3.  PTCA and stenting of the saphenous vein graft to the diagonal branch with deployment of a 3.5 mm x 8 mm and a 3.5 mm x 38 mm Xience Judy drug-eluting stent.  4.  100% occlusion of the saphenous vein graft to the obtuse marginal branch.  5.  Luminal irregularity in the left anterior descending artery with 100% occlusion of the diagonal branch.  6.  Nonfunctioning left internal mammary artery.  7.  Coronary  artery bypass grafting with:   a.   LIMA to the LAD.   b.   Saphenous vein graft to the obtuse marginal branch.   c.   Saphenous vein graft to the diagonal branch.  8.   Previous coronary angiogram done in  June 2019 which had revealed:  A.  Nonfunctioning left internal mammary artery with nonobstructive disease in the left anterior descending artery.  B .  No evidence of any obstructive disease noted in the right coronary artery.  C.  Saphenous vein graft to the obtuse marginal branch with sluggish flow with sustained patency in the proximal stent with 95 to 99% stenosis.  D.  Saphenous vein graft to the diagonal branch with 2 sequential stenosis of up to 80 to 90%.  E.  Nonobstructive disease in the native left anterior descending artery and circumflex artery  F.  Pronto thrombectomy of the saphenous vein graft to the obtuse marginal branch.  G.  PTCA and stenting of the saphenous vein graft to the obtuse marginal branch with deployment of 2.75 mm x 38 mm Xience Judy drug-eluting stent x2, 2.75 mm x 33 mm Xience Judy drug-eluting stent and a 2.25 mm x 12 mm Xience Judy drug-eluting stent  9.  Coronary angiogram done in 2015 had revealed:       a.   Nonfunctioning left internal mammary artery with  nonobstructive disease in the left anterior descending artery.       b.   Critical stenosis in the diagonal branch with a patent saphenous vein graft to the diagonal branch.       c.   A 100% occlusion of the distal circumflex artery with a  saphenous vein graft to the obtuse marginal branch with 99% stenosis status post Pronto thrombectomy of the saphenous vein graft to the obtuse marginal branch with deployment of a 2.5 mm x 23 mm and a 2.5 mm x 12 mm expedition stent.       d.   No evidence of any obstructive disease noted in the right coronary artery.  10.   Arterial hypertension with hypertensive heart disease.  11.   Concentric left ventricular hypertrophy with diastolic dysfunction.  12.   Mild mitral, mild  tricuspid regurgitation.  13.  Hyperlipidemia.  14.  Transient ischemic attack.  15.  Nonerosive gastritis.  16.  Gastrointestinal bleeding with subsequent stopping of the antiplatelet therapy Plavix.  17.  Strong family history for coronary artery disease.  18.  Hypothyroidism.           Past Surgical History:  1.   Coronary artery bypass grafting done in 2003 with:       a.   LIMA to the LAD.       b.   Saphenous vein graft to the obtuse marginal branch.       c.   Saphenous vein graft to the diagonal branch.  2.   Left inguinal herniorrhaphy.  3.   Esophagogastroduodenoscopy.  4.  Pronto thrombectomy of the saphenous vein graft to the obtuse marginal branch and stenting with deployment of a 2.5 mm x 23 mm and a 2.5 mm x 12 mm expedition stent 2015.  5.  Pronto thrombectomy of the saphenous vein graft to the obtuse marginal branch June 2019.  6.  PTCA and stenting of the saphenous vein graft to the obtuse marginal branch with deployment of 2.75 mm x 38 mm Xience Judy drug-eluting stent x2, 2.75 mm x 33 mm Xience Judy drug-eluting stent and a 2.25 mm x 12 mm Xience Judy drug-eluting stent June 2019.        SOCIAL HISTORY:  Previous history of tobacco abuse. Social alcohol intake. Patient is self-employed.     FAMILY HISTORY:  Significant for father who had premature atherosclerotic coronary artery disease.     CARDIAC RISK FACTORS:  1.   Male.  2.   Arterial hypertension.  3.   Hyperlipidemia.  4.   Family history for coronary artery disease.  5.   Previous history of tobacco abuse.          Allergies:  Allergies   Allergen Reactions   • Contrast Dye Anaphylaxis   • Shellfish-Derived Products Anaphylaxis       Home Medication::  Prior to Admission medications    Medication Sig Start Date End Date Taking? Authorizing Provider   aspirin 81 MG EC tablet Take 81 mg by mouth Daily.    ProviderLong MD   folic acid (FOLVITE) 400 MCG tablet Take 400 mcg by mouth Daily.    Long Cope MD    levothyroxine (SYNTHROID, LEVOTHROID) 25 MCG tablet Take 25 mcg by mouth Daily.    Long Cope MD   metoprolol tartrate (LOPRESSOR) 25 MG tablet Take 25 mg by mouth 2 (Two) Times a Day.    Long Cope MD   omeprazole (priLOSEC) 20 MG capsule Take 20 mg by mouth Daily.    Long Cope MD   ranolazine (RANEXA) 500 MG 12 hr tablet Take 500 mg by mouth 2 (Two) Times a Day.    Long Cope MD   rosuvastatin (CRESTOR) 10 MG tablet Take 10 mg by mouth Daily.    Long Cope MD   ticagrelor (BRILINTA) 90 MG tablet tablet Take 1 tablet by mouth 2 (Two) Times a Day. 6/29/19   Everton Bach MD   vitamin B-12 (CYANOCOBALAMIN) 100 MCG tablet Take 50 mcg by mouth Daily.    Long Cope MD         Review of Systems   Constitutional: Negative for chills, fever and unexpected weight change.   HENT: Negative for hearing loss and nosebleeds.    Eyes: Negative for visual disturbance.   Respiratory: Positive for chest tightness and shortness of breath. Negative for cough and wheezing.    Cardiovascular: Positive for chest pain. Negative for palpitations and leg swelling.   Gastrointestinal: Negative for abdominal pain, blood in stool, constipation, diarrhea, nausea and vomiting.   Endocrine: Negative for cold intolerance, heat intolerance, polydipsia, polyphagia and polyuria.   Genitourinary: Negative for hematuria.   Musculoskeletal: Negative for joint swelling, myalgias and neck pain.   Skin: Negative for color change, rash and wound.   Neurological: Negative for dizziness, seizures, syncope, light-headedness, numbness and headaches.   Hematological: Does not bruise/bleed easily.         Objective:       Pulse of 79 regular respiration of 18 blood pressure 130/70 height of 5 feet 11 inches weight of 200 pounds with a body mask index 28.  Physical Exam   Constitutional: He is oriented to person, place, and time. He appears well-developed and well-nourished.   HENT:   Head:  Normocephalic and atraumatic.   Left Ear: External ear normal.   Nose: Nose normal.   Mouth/Throat: Oropharynx is clear and moist.   Eyes: Pupils are equal, round, and reactive to light. Conjunctivae and EOM are normal. No scleral icterus.   Neck: Normal range of motion. Neck supple. No JVD present. No tracheal deviation present. No thyromegaly present.   Cardiovascular: Normal rate and regular rhythm.   Pulmonary/Chest: Breath sounds normal. No stridor.   Abdominal: Bowel sounds are normal.   Musculoskeletal: Normal range of motion.   Lymphadenopathy:     He has no cervical adenopathy.   Neurological: He is alert and oriented to person, place, and time. He has normal reflexes.   Skin: Skin is warm and dry.   Psychiatric: He has a normal mood and affect. His behavior is normal. Judgment and thought content normal.       Lab Review:           Invalid input(s): LABALBU, PROT                                    EKG:   ECG/EMG Results (last 24 hours)     ** No results found for the last 24 hours. **          Imaging:  Imaging Results (Last 24 Hours)     ** No results found for the last 24 hours. **          I personally viewed and interpreted the patient's EKG/Telemetry data.    Assessment:       1.  Chest pain.  2.  Atherosclerotic coronary artery disease with previous PTCA and stenting.  3.  Arterial hypertension.  4.  Hypertensive heart disease.         Plan:   1.  Chest pain.  Patient has history of documented atherosclerotic coronary artery disease with previous coronary artery bypass grafting done in 2003.  Patient last coronary angiogram was done in 2015 with a nonfunctioning left internal mammary artery and a saphenous vein graft to the obtuse marginal branch which had 99% stenosis.  Patient underwent successful PTCA and stenting and Pronto thrombectomy of the saphenous vein graft to the obtuse marginal branch.  Patient now has symptoms of chest pain which is similar in quality as the one which he had prior to  the stent placement.  Patient had recurrent symptoms of chest pain and underwent coronary angiogram in June 2019 with Pronto thrombectomy  rehospitalization on August 12, 2019 which had revealed 100% occlusion of the saphenous vein graft to the obtuse marginal branch with successful Pronto thrombectomy and PTCA and stenting of the saphenous vein graft to the obtuse marginal branch and PTCA and stenting of the saphenous vein graft to the obtuse marginal branch with 80 to 90% stenosis in the saphenous vein graft to the diagonal branch which was treated medically.  Patient was rehospitalized in September 2019 and underwent PTCA and stenting of the saphenous vein graft to the diagonal branch.  Patient now has recurrent symptoms of chest tightness.  Patient resting electrocardiogram did not reveal of any acute ST-T wave changes.  Patient underwent an exercise Cardiolite stress test.  Patient Cardiolite stress test was suggestive of reversible ischemia in the inferior anterior and lateral wall and a high risk study.  Due to the patient ongoing symptoms of chest pain with positive stress test patient was recommended a coronary angiogram.   Patient has documented history of atherosclerotic coronary artery disease with symptoms of substernal chest pain suggestive of crescendo unstable angina pectoris.  Patient has been explained risk-benefit treatment option for a coronary angiogram and an informed consent was obtained from the patient for the coronary angiogram.  Patient Mallampati score was II patient ASA classification was II.  Patient would undergo IV hydration and will check the renal function prior to the coronary angiogram.     Plan was to proceed with a left heart catheterization on 8/20/2020.     2.  Arterial hypertension.  Patient blood pressure is currently well controlled and patient will be continued on the present dose of the metoprolol.  Patient has not complained of having any symptoms of headache.  Patient  has been counseled to decrease her salt intake.        3.  Hypertensive heart disease nonrheumatic mild mitral and nonrheumatic mild tricuspid regurgitation.  Clinically at the present time patient not in congestive heart failure.  Patient at the present time would be continued on the present dose of the metoprolol.  If the patient blood pressure starts rising patient would be started on losartan.        4.  Hyperlipidemia.  Patient has been recommended to continue with the present dose of the Crestor.  Patient has been counseled on low-fat low-cholesterol diet and to continue with the present dose of the Crestor.  Patient is to undergo a lipid profile including liver function test evaluation.        5.  Hypothyroidism.  Patient is currently on thyroid supplement.  Patient is currently on thyroid supplement.        6.  Gastroesophageal reflux disease.  Patient is currently on Prilosec.     7.  Shortness of breath.  Patient symptoms of shortness of breath since the last percutaneous intervention could be secondary to the Brilinta.  Patient has been recommended to stop the Brilinta and would start the patient on Plavix 75 mg daily.     Above plan of management were discussed with the patient.     Plan was to proceed with a coronary angiogram on 8/20/2020.      Time: time spent in face-to-face evaluation of greater than 55 minutes and interacting and formulating examining and discussing the plan with the patient with 50% of greater time spent in face-to-face interaction.    Electronically signed by Patricio Barnard MD, 08/20/20, 9:03 AM.    Dictated utilizing Dragon dictation.

## 2020-08-20 NOTE — H&P
Cardiology History and Physical Note           Patient Name: Lawrence Danielson  Age/Sex: 65 y.o. male  : 1955  MRN: [id number]     Date of Admission : 2020     Primary care Physician: Angel Kapadia MD     Reason for Admission: Positive stress test with history of atherosclerotic coronary artery disease left heart catheterization.     Subjective:         Chief Complaint: Chest pain.     History of Present Illness:  Lawrence Danielson is a 65 y.o. male     Height of 5 feet 11 inches weight of 202 pounds body mass index of 28 with a past medical history significant for atherosclerotic coronary artery disease with last coronary angiogram done in 2019 with 100% occlusion of the saphenous vein graft to the diagonal branch with PTCA and stenting with deployment of a 3.5 mm x 8 mm and a 3.5 mm x 28 mm Xience Judy drug-eluting stent, 100% occlusion of the saphenous vein graft to the obtuse marginal branch and luminal irregularity in the left anterior descending artery with 100% occlusion of the diagonal branch and a nonfunctioning left internal mammary artery, the first coronary angiogram done in  which had revealed 100% occlusion of the distal circumflex artery with critical stenosis in the left anterior descending artery and right coronary artery with subsequent coronary artery bypass grafting with a LIMA to the LAD, saphenous vein graft to the obtuse marginal branch, saphenous vein graft to the first diagonal branch,  coronary angiogram done in 2015 which had revealed a patent saphenous vein graft to the diagonal branch with a nonfunctioning left internal mammary artery, 99% stenosis in the saphenous vein graft to the obtuse marginal branch, PTCA and stenting of the saphenous vein graft to the obtuse marginal branch with deployment of a 2.5 mm x 23 mm and a 2.5 mm x 12 mm expedition stent, last coronary angiogram done in 2019 which had revealed nonfunctioning left internal  mammary artery with nonobstructive disease in the left anterior descending artery and no evidence of any obstructive disease noted in the right coronary artery with a saphenous vein graft to the obtuse marginal branch with sluggish flow with 95 to 99% stenosis and a saphenous vein graft to the diagonal branch with 2 sequential 80 to 90% stenosis with successful Pronto thrombectomy of the saphenous vein graft to the obtuse marginal branch and PTCA and stenting of the saphenous vein graft to the obtuse marginal branch with deployment of a 2.75 mm x 38 mm Xience Judy drug-eluting stent x2 and a 2.75 mm x 33 mm Xience Judy drug-eluting stent and a 2.25 mm x 12 mm Xience Judy drug-eluting stent, with the diagonal branch stenosis which was treated medically, last coronary angiogram done in August 2019 which had revealed 100% occlusion of the saphenous vein graft to the obtuse marginal branch with Pronto thrombectomy of the saphenous vein graft to the obtuse marginal branch and PTCA and stenting of the saphenous vein graft to the obtuse marginal branch with deployment of a 2.75 mm x 38 mm Xience Judy drug-eluting stent x2 and a 2.75 mm x 33 mm Xience Judy drug-eluting stent and a 2.25 mm x 12 mm Xience Judy drug-eluting stent, 80 to 90% stenosis in the saphenous vein graft to the diagonal branch, arterial hypertension, hypertensive heart disease, hyperlipidemia, and strong family history for coronary artery disease.     Patient on April 1 has symptoms of substernal chest discomfort which was intermittent with exercise.  Patient subsequently had left-sided chest pain along with shoulder discomfort.  Patient was in [place] during that episode and did not seek any medical attention.  Patient description of the chest discomfort was of a different quality.  Patient had returned back from [place].  Patient now complains of having left-sided shoulder pain and chest tightness.  There is some chest pain with point  tenderness in the back.  Patient on further questioning denies any associated diaphoresis.  Patient does have shortness of breath.  Patient has had symptoms of palpitation.     Patient resting electrocardiogram done reveals sinus rhythm at the rate of 79 bpm with nonspecific ST-T wave changes.     Patient on further questioning denies any fever chill productive cough.  Patient denies any lower extremity edema.  Patient denies any calf muscle tenderness.     Patient denies any episodes of any bleeding diastasis.  Patient since the last stent placement in October 2019 was started on Brilinta and has had on and off symptoms of shortness of breath.  Patient was informed patient symptoms of shortness of breath could be secondary to the Brilinta.  Patient denies any episodes of any bleeding diathesis.     Patient underwent an exercise Cardiolite stress test.  Patient Cardiolite stress test was suggestive of reversible ischemia in the inferior anterior and lateral wall and a high risk study.  Due to the patient ongoing symptoms of chest pain with positive stress test patient was recommended a coronary angiogram.          Patient resting electrocardiogram done revealed sinus rhythm at the rate of 79 bpm with nonspecific ST-T wave changes.  Patient blood pressure 130/70 with a pulse of 79 respiration of 18.     Patient 10 point review of system except for stated in the history of present illness and negative.        Concurrent Medical History:  1.  Chest pain with positive nuclear Cardiolite stress test.  2.  Atherosclerotic coronary artery disease.  3.  PTCA and stenting of the saphenous vein graft to the diagonal branch with deployment of a 3.5 mm x 8 mm and a 3.5 mm x 38 mm Xience Judy drug-eluting stent.  4.  100% occlusion of the saphenous vein graft to the obtuse marginal branch.  5.  Luminal irregularity in the left anterior descending artery with 100% occlusion of the diagonal branch.  6.  Nonfunctioning left  internal mammary artery.  7.  Coronary artery bypass grafting with:   a.   LIMA to the LAD.   b.   Saphenous vein graft to the obtuse marginal branch.   c.   Saphenous vein graft to the diagonal branch.  8.   Previous coronary angiogram done in  June 2019 which had revealed:  A.  Nonfunctioning left internal mammary artery with nonobstructive disease in the left anterior descending artery.  B .  No evidence of any obstructive disease noted in the right coronary artery.  C.  Saphenous vein graft to the obtuse marginal branch with sluggish flow with sustained patency in the proximal stent with 95 to 99% stenosis.  D.  Saphenous vein graft to the diagonal branch with 2 sequential stenosis of up to 80 to 90%.  E.  Nonobstructive disease in the native left anterior descending artery and circumflex artery  F.  Pronto thrombectomy of the saphenous vein graft to the obtuse marginal branch.  G.  PTCA and stenting of the saphenous vein graft to the obtuse marginal branch with deployment of 2.75 mm x 38 mm Xience Judy drug-eluting stent x2, 2.75 mm x 33 mm Xience Judy drug-eluting stent and a 2.25 mm x 12 mm Xience Judy drug-eluting stent  9.  Coronary angiogram done in 2015 had revealed:       a.   Nonfunctioning left internal mammary artery with  nonobstructive disease in the left anterior descending artery.       b.   Critical stenosis in the diagonal branch with a patent saphenous vein graft to the diagonal branch.       c.   A 100% occlusion of the distal circumflex artery with a  saphenous vein graft to the obtuse marginal branch with 99% stenosis status post Pronto thrombectomy of the saphenous vein graft to the obtuse marginal branch with deployment of a 2.5 mm x 23 mm and a 2.5 mm x 12 mm expedition stent.       d.   No evidence of any obstructive disease noted in the right coronary artery.  10.   Arterial hypertension with hypertensive heart disease.  11.   Concentric left ventricular hypertrophy with diastolic  dysfunction.  12.   Mild mitral, mild tricuspid regurgitation.  13.  Hyperlipidemia.  14.  Transient ischemic attack.  15.  Nonerosive gastritis.  16.  Gastrointestinal bleeding with subsequent stopping of the antiplatelet therapy Plavix.  17.  Strong family history for coronary artery disease.  18.  Hypothyroidism.           Past Surgical History:  1.   Coronary artery bypass grafting done in 2003 with:       a.   LIMA to the LAD.       b.   Saphenous vein graft to the obtuse marginal branch.       c.   Saphenous vein graft to the diagonal branch.  2.   Left inguinal herniorrhaphy.  3.   Esophagogastroduodenoscopy.  4.  Pronto thrombectomy of the saphenous vein graft to the obtuse marginal branch and stenting with deployment of a 2.5 mm x 23 mm and a 2.5 mm x 12 mm expedition stent 2015.  5.  Pronto thrombectomy of the saphenous vein graft to the obtuse marginal branch June 2019.  6.  PTCA and stenting of the saphenous vein graft to the obtuse marginal branch with deployment of 2.75 mm x 38 mm Xience Judy drug-eluting stent x2, 2.75 mm x 33 mm Xience Judy drug-eluting stent and a 2.25 mm x 12 mm Xience Judy drug-eluting stent June 2019.        SOCIAL HISTORY:  Previous history of tobacco abuse. Social alcohol intake. Patient is self-employed.     FAMILY HISTORY:  Significant for father who had premature atherosclerotic coronary artery disease.     CARDIAC RISK FACTORS:  1.   Male.  2.   Arterial hypertension.  3.   Hyperlipidemia.  4.   Family history for coronary artery disease.  5.   Previous history of tobacco abuse.           Allergies:       Allergies   Allergen Reactions   • Contrast Dye Anaphylaxis   • Shellfish-Derived Products Anaphylaxis         Home Medication::          Prior to Admission medications    Medication Sig Start Date End Date Taking? Authorizing Provider   aspirin 81 MG EC tablet Take 81 mg by mouth Daily.       Provider, MD Long   folic acid (FOLVITE) 400 MCG tablet Take 400  mcg by mouth Daily.       Long Cope MD   levothyroxine (SYNTHROID, LEVOTHROID) 25 MCG tablet Take 25 mcg by mouth Daily.       Long Cope MD   metoprolol tartrate (LOPRESSOR) 25 MG tablet Take 25 mg by mouth 2 (Two) Times a Day.       Long Cope MD   omeprazole (priLOSEC) 20 MG capsule Take 20 mg by mouth Daily.       Long Cope MD   ranolazine (RANEXA) 500 MG 12 hr tablet Take 500 mg by mouth 2 (Two) Times a Day.       Long Cope MD   rosuvastatin (CRESTOR) 10 MG tablet Take 10 mg by mouth Daily.       Long Cope MD   ticagrelor (BRILINTA) 90 MG tablet tablet Take 1 tablet by mouth 2 (Two) Times a Day. 6/29/19     Everton Bach MD   vitamin B-12 (CYANOCOBALAMIN) 100 MCG tablet Take 50 mcg by mouth Daily.       Long Cope MD            Review of Systems   Constitutional: Negative for chills, fever and unexpected weight change.   HENT: Negative for hearing loss and nosebleeds.    Eyes: Negative for visual disturbance.   Respiratory: Positive for chest tightness and shortness of breath. Negative for cough and wheezing.    Cardiovascular: Positive for chest pain. Negative for palpitations and leg swelling.   Gastrointestinal: Negative for abdominal pain, blood in stool, constipation, diarrhea, nausea and vomiting.   Endocrine: Negative for cold intolerance, heat intolerance, polydipsia, polyphagia and polyuria.   Genitourinary: Negative for hematuria.   Musculoskeletal: Negative for joint swelling, myalgias and neck pain.   Skin: Negative for color change, rash and wound.   Neurological: Negative for dizziness, seizures, syncope, light-headedness, numbness and headaches.   Hematological: Does not bruise/bleed easily.            Objective:      Pulse of 79 regular respiration of 18 blood pressure 130/70 height of 5 feet 11 inches weight of 200 pounds with a body mask index 28.     Physical Exam   Constitutional: He is oriented to person,  place, and time. He appears well-developed and well-nourished.   HENT:   Head: Normocephalic and atraumatic.   Left Ear: External ear normal.   Nose: Nose normal.   Mouth/Throat: Oropharynx is clear and moist.   Eyes: Pupils are equal, round, and reactive to light. Conjunctivae and EOM are normal. No scleral icterus.   Neck: Normal range of motion. Neck supple. No JVD present. No tracheal deviation present. No thyromegaly present.   Cardiovascular:   Regular S1 and S2 with a soft systolic murmur best heard at the apex.   Pulmonary/Chest: Breath sounds normal. No stridor.   Abdominal: Bowel sounds are normal.   Musculoskeletal: Normal range of motion.   Lymphadenopathy:     He has no cervical adenopathy.   Neurological: He is alert and oriented to person, place, and time. He has normal reflexes.   Skin: Skin is warm and dry.   Psychiatric: He has a normal mood and affect. His behavior is normal. Judgment and thought content normal.         Lab Review:             Invalid input(s): LABALBU, PROT                                     EKG:       ECG/EMG Results (last 24 hours)      ** No results found for the last 24 hours. **             Imaging:      Imaging Results (Last 24 Hours)      ** No results found for the last 24 hours. **             I personally viewed and interpreted the patient's EKG/Telemetry data.     Assessment:   1.  Chest pain.  2.  Atherosclerotic coronary artery disease with previous PTCA and stenting.  3.  Arterial hypertension.  4.  Hypertensive heart disease.              Plan:   1.  Chest pain.  Patient has history of documented atherosclerotic coronary artery disease with previous coronary artery bypass grafting done in 2003.  Patient last coronary angiogram was done in 2015 with a nonfunctioning left internal mammary artery and a saphenous vein graft to the obtuse marginal branch which had 99% stenosis.  Patient underwent successful PTCA and stenting and Pronto thrombectomy of the saphenous  vein graft to the obtuse marginal branch.  Patient now has symptoms of chest pain which is similar in quality as the one which he had prior to the stent placement.  Patient had recurrent symptoms of chest pain and underwent coronary angiogram in June 2019 with Pronto thrombectomy  rehospitalization on August 12, 2019 which had revealed 100% occlusion of the saphenous vein graft to the obtuse marginal branch with successful Pronto thrombectomy and PTCA and stenting of the saphenous vein graft to the obtuse marginal branch and PTCA and stenting of the saphenous vein graft to the obtuse marginal branch with 80 to 90% stenosis in the saphenous vein graft to the diagonal branch which was treated medically.  Patient was rehospitalized in September 2019 and underwent PTCA and stenting of the saphenous vein graft to the diagonal branch.  Patient now has recurrent symptoms of chest tightness.  Patient resting electrocardiogram did not reveal of any acute ST-T wave changes.  Patient underwent an exercise Cardiolite stress test.  Patient Cardiolite stress test was suggestive of reversible ischemia in the inferior anterior and lateral wall and a high risk study.  Due to the patient ongoing symptoms of chest pain with positive stress test patient was recommended a coronary angiogram.   Patient has documented history of atherosclerotic coronary artery disease with symptoms of substernal chest pain suggestive of crescendo unstable angina pectoris.  Patient has been explained risk-benefit treatment option for a coronary angiogram and an informed consent was obtained from the patient for the coronary angiogram.  Patient Mallampati score was II patient ASA classification was II.  Patient would undergo IV hydration and will check the renal function prior to the coronary angiogram.     Plan was to proceed with a left heart catheterization on 8/20/2020.     2.  Arterial hypertension.  Patient blood pressure is currently well controlled  and patient will be continued on the present dose of the metoprolol.  Patient has not complained of having any symptoms of headache.  Patient has been counseled to decrease her salt intake.        3.  Hypertensive heart disease nonrheumatic mild mitral and nonrheumatic mild tricuspid regurgitation.  Clinically at the present time patient not in congestive heart failure.  Patient at the present time would be continued on the present dose of the metoprolol.  If the patient blood pressure starts rising patient would be started on losartan.        4.  Hyperlipidemia.  Patient has been recommended to continue with the present dose of the Crestor.  Patient has been counseled on low-fat low-cholesterol diet and to continue with the present dose of the Crestor.  Patient is to undergo a lipid profile including liver function test evaluation.        5.  Hypothyroidism.  Patient is currently on thyroid supplement.  Patient is currently on thyroid supplement.        6.  Gastroesophageal reflux disease.  Patient is currently on Prilosec.     7.  Shortness of breath.  Patient symptoms of shortness of breath since the last percutaneous intervention could be secondary to the Brilinta.  Patient has been recommended to stop the Brilinta and would start the patient on Plavix 75 mg daily.     Above plan of management were discussed with the patient.     Plan was to proceed with a coronary angiogram on 8/20/2020.      Time: time spent in face-to-face evaluation of greater than 55 minutes and interacting and formulating examining and discussing the plan with the patient with 50% of greater time spent in face-to-face interaction.     Insert disclaimer

## 2020-08-30 LAB
BH CV STRESS BP STAGE 1: NORMAL
BH CV STRESS BP STAGE 2: NORMAL
BH CV STRESS BP STAGE 3: NORMAL
BH CV STRESS DURATION MIN STAGE 1: 3
BH CV STRESS DURATION MIN STAGE 2: 3
BH CV STRESS DURATION SEC STAGE 1: 0
BH CV STRESS DURATION SEC STAGE 2: 0
BH CV STRESS DURATION SEC STAGE 3: 45
BH CV STRESS DURATION SEC STAGE 4: 31
BH CV STRESS GRADE STAGE 1: 10
BH CV STRESS GRADE STAGE 2: 12
BH CV STRESS GRADE STAGE 3: 14
BH CV STRESS GRADE STAGE 4: 16
BH CV STRESS HR STAGE 1: 95
BH CV STRESS HR STAGE 2: 122
BH CV STRESS HR STAGE 3: 136
BH CV STRESS HR STAGE 4: 142
BH CV STRESS METS STAGE 1: 5
BH CV STRESS METS STAGE 2: 7.5
BH CV STRESS METS STAGE 3: 10
BH CV STRESS METS STAGE 4: 13.5
BH CV STRESS PROTOCOL 1: NORMAL
BH CV STRESS RECOVERY BP: NORMAL MMHG
BH CV STRESS RECOVERY HR: 84 BPM
BH CV STRESS SPEED STAGE 1: 1.7
BH CV STRESS SPEED STAGE 2: 2.5
BH CV STRESS SPEED STAGE 3: 3.4
BH CV STRESS SPEED STAGE 4: 2.8
BH CV STRESS STAGE 1: 1
BH CV STRESS STAGE 2: 2
BH CV STRESS STAGE 3: 3
BH CV STRESS STAGE 4: 4
LV EF NUC BP: 69 %
MAXIMAL PREDICTED HEART RATE: 155 BPM
PERCENT MAX PREDICTED HR: 94.19 %
STRESS BASELINE BP: NORMAL MMHG
STRESS BASELINE HR: 71 BPM
STRESS PERCENT HR: 111 %
STRESS POST ESTIMATED WORKLOAD: 9.2 METS
STRESS POST EXERCISE DUR MIN: 7 MIN
STRESS POST EXERCISE DUR SEC: 15 SEC
STRESS POST PEAK BP: NORMAL MMHG
STRESS POST PEAK HR: 146 BPM
STRESS TARGET HR: 132 BPM

## 2023-07-18 ENCOUNTER — HOSPITAL ENCOUNTER (OUTPATIENT)
Facility: HOSPITAL | Age: 68
Discharge: HOME | End: 2023-07-19
Attending: EMERGENCY MEDICINE | Admitting: HOSPITALIST
Payer: MEDICARE

## 2023-07-18 ENCOUNTER — APPOINTMENT (OUTPATIENT)
Dept: CT IMAGING | Facility: HOSPITAL | Age: 68
End: 2023-07-18
Payer: MEDICARE

## 2023-07-18 ENCOUNTER — APPOINTMENT (OUTPATIENT)
Dept: GENERAL RADIOLOGY | Facility: HOSPITAL | Age: 68
End: 2023-07-18
Payer: MEDICARE

## 2023-07-18 DIAGNOSIS — I48.0 PAROXYSMAL ATRIAL FIBRILLATION: ICD-10-CM

## 2023-07-18 DIAGNOSIS — G45.9 TIA (TRANSIENT ISCHEMIC ATTACK): Primary | ICD-10-CM

## 2023-07-18 LAB
ALBUMIN SERPL-MCNC: 4.3 G/DL (ref 3.5–5.2)
ALBUMIN/GLOB SERPL: 1.4 G/DL
ALP SERPL-CCNC: 75 U/L (ref 39–117)
ALT SERPL W P-5'-P-CCNC: 20 U/L (ref 1–41)
ANION GAP SERPL CALCULATED.3IONS-SCNC: 10 MMOL/L (ref 5–15)
APTT PPP: 22 SECONDS (ref 20–40.3)
AST SERPL-CCNC: 18 U/L (ref 1–40)
BASOPHILS # BLD AUTO: 0.08 10*3/MM3 (ref 0–0.2)
BASOPHILS NFR BLD AUTO: 1.2 % (ref 0–1.5)
BILIRUB SERPL-MCNC: 0.4 MG/DL (ref 0–1.2)
BUN SERPL-MCNC: 9 MG/DL (ref 8–23)
BUN/CREAT SERPL: 8.5 (ref 7–25)
CALCIUM SPEC-SCNC: 9.3 MG/DL (ref 8.6–10.5)
CHLORIDE SERPL-SCNC: 102 MMOL/L (ref 98–107)
CO2 SERPL-SCNC: 28 MMOL/L (ref 22–29)
CREAT SERPL-MCNC: 1.06 MG/DL (ref 0.76–1.27)
DEPRECATED RDW RBC AUTO: 42 FL (ref 37–54)
EGFRCR SERPLBLD CKD-EPI 2021: 76.4 ML/MIN/1.73
EOSINOPHIL # BLD AUTO: 0.34 10*3/MM3 (ref 0–0.4)
EOSINOPHIL NFR BLD AUTO: 5.2 % (ref 0.3–6.2)
ERYTHROCYTE [DISTWIDTH] IN BLOOD BY AUTOMATED COUNT: 11.8 % (ref 12.3–15.4)
GEN 5 2HR TROPONIN T REFLEX: 9 NG/L
GLOBULIN UR ELPH-MCNC: 3 GM/DL
GLUCOSE BLDC GLUCOMTR-MCNC: 96 MG/DL (ref 70–130)
GLUCOSE SERPL-MCNC: 103 MG/DL (ref 65–99)
HCT VFR BLD AUTO: 41.8 % (ref 37.5–51)
HGB BLD-MCNC: 14.7 G/DL (ref 13–17.7)
HOLD SPECIMEN: NORMAL
IMM GRANULOCYTES # BLD AUTO: 0.02 10*3/MM3 (ref 0–0.05)
IMM GRANULOCYTES NFR BLD AUTO: 0.3 % (ref 0–0.5)
INR PPP: 0.95 (ref 0.8–1.2)
LYMPHOCYTES # BLD AUTO: 2.24 10*3/MM3 (ref 0.7–3.1)
LYMPHOCYTES NFR BLD AUTO: 34 % (ref 19.6–45.3)
MCH RBC QN AUTO: 34.3 PG (ref 26.6–33)
MCHC RBC AUTO-ENTMCNC: 35.2 G/DL (ref 31.5–35.7)
MCV RBC AUTO: 97.4 FL (ref 79–97)
MONOCYTES # BLD AUTO: 0.84 10*3/MM3 (ref 0.1–0.9)
MONOCYTES NFR BLD AUTO: 12.7 % (ref 5–12)
NEUTROPHILS NFR BLD AUTO: 3.07 10*3/MM3 (ref 1.7–7)
NEUTROPHILS NFR BLD AUTO: 46.6 % (ref 42.7–76)
NRBC BLD AUTO-RTO: 0 /100 WBC (ref 0–0.2)
PLATELET # BLD AUTO: 190 10*3/MM3 (ref 140–450)
PMV BLD AUTO: 10.1 FL (ref 6–12)
POTASSIUM SERPL-SCNC: 4.7 MMOL/L (ref 3.5–5.2)
PROT SERPL-MCNC: 7.3 G/DL (ref 6–8.5)
PROTHROMBIN TIME: 12.7 SECONDS (ref 11.1–15.3)
RBC # BLD AUTO: 4.29 10*6/MM3 (ref 4.14–5.8)
SODIUM SERPL-SCNC: 140 MMOL/L (ref 136–145)
TROPONIN T DELTA: 2 NG/L
TROPONIN T SERPL HS-MCNC: 7 NG/L
WBC NRBC COR # BLD: 6.59 10*3/MM3 (ref 3.4–10.8)

## 2023-07-18 PROCEDURE — 80053 COMPREHEN METABOLIC PANEL: CPT | Performed by: EMERGENCY MEDICINE

## 2023-07-18 PROCEDURE — 82948 REAGENT STRIP/BLOOD GLUCOSE: CPT

## 2023-07-18 PROCEDURE — 71045 X-RAY EXAM CHEST 1 VIEW: CPT

## 2023-07-18 PROCEDURE — 85025 COMPLETE CBC W/AUTO DIFF WBC: CPT | Performed by: EMERGENCY MEDICINE

## 2023-07-18 PROCEDURE — 93005 ELECTROCARDIOGRAM TRACING: CPT | Performed by: EMERGENCY MEDICINE

## 2023-07-18 PROCEDURE — 36415 COLL VENOUS BLD VENIPUNCTURE: CPT | Performed by: HOSPITALIST

## 2023-07-18 PROCEDURE — 93010 ELECTROCARDIOGRAM REPORT: CPT | Performed by: INTERNAL MEDICINE

## 2023-07-18 PROCEDURE — 70450 CT HEAD/BRAIN W/O DYE: CPT

## 2023-07-18 PROCEDURE — 84484 ASSAY OF TROPONIN QUANT: CPT | Performed by: HOSPITALIST

## 2023-07-18 PROCEDURE — 84484 ASSAY OF TROPONIN QUANT: CPT | Performed by: EMERGENCY MEDICINE

## 2023-07-18 PROCEDURE — G0378 HOSPITAL OBSERVATION PER HR: HCPCS

## 2023-07-18 PROCEDURE — 85610 PROTHROMBIN TIME: CPT | Performed by: EMERGENCY MEDICINE

## 2023-07-18 PROCEDURE — 99285 EMERGENCY DEPT VISIT HI MDM: CPT

## 2023-07-18 PROCEDURE — 85730 THROMBOPLASTIN TIME PARTIAL: CPT | Performed by: EMERGENCY MEDICINE

## 2023-07-18 PROCEDURE — 96361 HYDRATE IV INFUSION ADD-ON: CPT

## 2023-07-18 RX ORDER — SODIUM CHLORIDE 9 MG/ML
40 INJECTION, SOLUTION INTRAVENOUS AS NEEDED
Status: DISCONTINUED | OUTPATIENT
Start: 2023-07-18 | End: 2023-07-19 | Stop reason: HOSPADM

## 2023-07-18 RX ORDER — ROSUVASTATIN CALCIUM 20 MG/1
40 TABLET, COATED ORAL DAILY
Status: DISCONTINUED | OUTPATIENT
Start: 2023-07-19 | End: 2023-07-19 | Stop reason: HOSPADM

## 2023-07-18 RX ORDER — BISACODYL 10 MG
10 SUPPOSITORY, RECTAL RECTAL DAILY PRN
Status: DISCONTINUED | OUTPATIENT
Start: 2023-07-18 | End: 2023-07-19 | Stop reason: HOSPADM

## 2023-07-18 RX ORDER — ROSUVASTATIN CALCIUM 10 MG/1
20 TABLET, COATED ORAL ONCE
Status: COMPLETED | OUTPATIENT
Start: 2023-07-18 | End: 2023-07-18

## 2023-07-18 RX ORDER — RANOLAZINE 500 MG/1
1000 TABLET, EXTENDED RELEASE ORAL 2 TIMES DAILY
Status: DISCONTINUED | OUTPATIENT
Start: 2023-07-18 | End: 2023-07-19 | Stop reason: HOSPADM

## 2023-07-18 RX ORDER — ASPIRIN 325 MG
325 TABLET, DELAYED RELEASE (ENTERIC COATED) ORAL ONCE
Status: COMPLETED | OUTPATIENT
Start: 2023-07-18 | End: 2023-07-18

## 2023-07-18 RX ORDER — BISACODYL 5 MG/1
5 TABLET, DELAYED RELEASE ORAL DAILY PRN
Status: DISCONTINUED | OUTPATIENT
Start: 2023-07-18 | End: 2023-07-19 | Stop reason: HOSPADM

## 2023-07-18 RX ORDER — SODIUM CHLORIDE 9 MG/ML
125 INJECTION, SOLUTION INTRAVENOUS CONTINUOUS
Status: DISCONTINUED | OUTPATIENT
Start: 2023-07-18 | End: 2023-07-19 | Stop reason: HOSPADM

## 2023-07-18 RX ORDER — AMOXICILLIN 250 MG
2 CAPSULE ORAL 2 TIMES DAILY
Status: DISCONTINUED | OUTPATIENT
Start: 2023-07-19 | End: 2023-07-19 | Stop reason: HOSPADM

## 2023-07-18 RX ORDER — SODIUM CHLORIDE 0.9 % (FLUSH) 0.9 %
10 SYRINGE (ML) INJECTION AS NEEDED
Status: DISCONTINUED | OUTPATIENT
Start: 2023-07-18 | End: 2023-07-19 | Stop reason: HOSPADM

## 2023-07-18 RX ORDER — LEVOTHYROXINE SODIUM 0.05 MG/1
50 TABLET ORAL DAILY
Status: DISCONTINUED | OUTPATIENT
Start: 2023-07-19 | End: 2023-07-19 | Stop reason: HOSPADM

## 2023-07-18 RX ORDER — POLYETHYLENE GLYCOL 3350 17 G/17G
17 POWDER, FOR SOLUTION ORAL DAILY PRN
Status: DISCONTINUED | OUTPATIENT
Start: 2023-07-18 | End: 2023-07-19 | Stop reason: HOSPADM

## 2023-07-18 RX ORDER — PANTOPRAZOLE SODIUM 40 MG/1
40 TABLET, DELAYED RELEASE ORAL DAILY
COMMUNITY
Start: 2023-06-09

## 2023-07-18 RX ORDER — SODIUM CHLORIDE 0.9 % (FLUSH) 0.9 %
10 SYRINGE (ML) INJECTION EVERY 12 HOURS SCHEDULED
Status: DISCONTINUED | OUTPATIENT
Start: 2023-07-18 | End: 2023-07-19 | Stop reason: HOSPADM

## 2023-07-18 RX ORDER — ENOXAPARIN SODIUM 100 MG/ML
40 INJECTION SUBCUTANEOUS DAILY
Status: DISCONTINUED | OUTPATIENT
Start: 2023-07-18 | End: 2023-07-19 | Stop reason: HOSPADM

## 2023-07-18 RX ORDER — PANTOPRAZOLE SODIUM 40 MG/1
40 TABLET, DELAYED RELEASE ORAL DAILY
Status: DISCONTINUED | OUTPATIENT
Start: 2023-07-19 | End: 2023-07-19 | Stop reason: HOSPADM

## 2023-07-18 RX ORDER — ASPIRIN 81 MG/1
81 TABLET ORAL DAILY
Status: DISCONTINUED | OUTPATIENT
Start: 2023-07-19 | End: 2023-07-19 | Stop reason: HOSPADM

## 2023-07-18 RX ORDER — FOLIC ACID 1 MG/1
500 TABLET ORAL DAILY
Status: DISCONTINUED | OUTPATIENT
Start: 2023-07-19 | End: 2023-07-19 | Stop reason: HOSPADM

## 2023-07-18 RX ORDER — UBIDECARENONE 75 MG
50 CAPSULE ORAL DAILY
Status: DISCONTINUED | OUTPATIENT
Start: 2023-07-19 | End: 2023-07-19 | Stop reason: HOSPADM

## 2023-07-18 RX ADMIN — SODIUM CHLORIDE 125 ML/HR: 9 INJECTION, SOLUTION INTRAVENOUS at 19:00

## 2023-07-18 RX ADMIN — SODIUM CHLORIDE 125 ML/HR: 9 INJECTION, SOLUTION INTRAVENOUS at 23:02

## 2023-07-18 RX ADMIN — ROSUVASTATIN CALCIUM 20 MG: 10 TABLET, FILM COATED ORAL at 19:26

## 2023-07-18 RX ADMIN — ASPIRIN 325 MG: 325 TABLET, COATED ORAL at 19:26

## 2023-07-18 RX ADMIN — RANOLAZINE 1000 MG: 500 TABLET, FILM COATED, EXTENDED RELEASE ORAL at 22:55

## 2023-07-18 NOTE — ED PROVIDER NOTES
Subjective   History of Present Illness  67yo male pmh significant htn/hyperlipidemia/cad/tia/hypothyroid presents ED c/o onset 1730hrs LUE numbness associated with acute confusional state lasting approximately 30 minutes with spontaneous resolution.  ROS (+) dizziness.  Denies headache/diplopia/vision loss/motor weakness/dysarthria/dysphagia/syncope/chest pain/vertigo.  Pt denies acute physical complaints at time of evaluation.    History provided by:  Patient and spouse  Neurologic Problem  Associated symptoms include confusion.     Review of Systems   Constitutional: Negative.    HENT: Negative.     Respiratory: Negative.     Cardiovascular: Negative.    Gastrointestinal: Negative.    Genitourinary: Negative.    Musculoskeletal: Negative.    Skin: Negative.    Neurological:  Positive for numbness.   Psychiatric/Behavioral:  Positive for confusion.    All other systems reviewed and are negative.    Past Medical History:   Diagnosis Date    CHF (congestive heart failure)     Coronary artery disease     Disease of thyroid gland     GERD (gastroesophageal reflux disease)     High cholesterol        Allergies   Allergen Reactions    Contrast Dye (Echo Or Unknown Ct/Mr) Anaphylaxis    Shellfish-Derived Products Anaphylaxis       Past Surgical History:   Procedure Laterality Date    CARDIAC CATHETERIZATION N/A 6/28/2019    Procedure: Left Heart Cath;  Surgeon: Patricio Barnard MD;  Location: [facility] INVASIVE LOCATION;  Service: Cardiology    CARDIAC CATHETERIZATION N/A 8/12/2019    Procedure: Left Heart Cath 8/12/2019 @ 9:00 AM Staged PCI;  Surgeon: Patricio Barnard MD;  Location: [facility] CATH INVASIVE LOCATION;  Service: Cardiology    CARDIAC CATHETERIZATION N/A 9/30/2019    Procedure: Left Heart Cath;  Surgeon: Patricio Barnard MD;  Location: [facility] CATH INVASIVE LOCATION;  Service: Cardiology    CARDIAC CATHETERIZATION Left 8/20/2020    Procedure: Left Heart Cath 8/20/20 @ 12;  Surgeon: Patricio Barnard MD;   Location: [facility] INVASIVE LOCATION;  Service: Cardiology;  Laterality: Left;    CARDIAC SURGERY      CORONARY ARTERY BYPASS GRAFT      HERNIA REPAIR         Family History   Problem Relation Age of Onset    Colon cancer Father     No Known Problems Mother        Social History     Socioeconomic History    Marital status:    Tobacco Use    Smoking status: Former    Smokeless tobacco: Never   Substance and Sexual Activity    Alcohol use: Yes     Comment: occasionally    Drug use: No    Sexual activity: Defer           Objective   Physical Exam  Vitals and nursing note reviewed.   Constitutional:       Appearance: Normal appearance.   HENT:      Head: Normocephalic and atraumatic.      Right Ear: Tympanic membrane, ear canal and external ear normal.      Left Ear: Tympanic membrane, ear canal and external ear normal.      Nose: Nose normal.      Mouth/Throat:      Mouth: Mucous membranes are moist.      Pharynx: Oropharynx is clear. No oropharyngeal exudate or posterior oropharyngeal erythema.   Eyes:      General: No visual field deficit.     Pupils: Pupils are equal, round, and reactive to light.   Cardiovascular:      Rate and Rhythm: Normal rate and regular rhythm.      Pulses: Normal pulses.      Heart sounds: Normal heart sounds. No murmur heard.    No friction rub. No gallop.   Pulmonary:      Effort: Pulmonary effort is normal. No respiratory distress.      Breath sounds: Normal breath sounds. No wheezing, rhonchi or rales.   Abdominal:      General: Abdomen is flat. Bowel sounds are normal. There is no distension.      Palpations: Abdomen is soft.      Tenderness: There is no abdominal tenderness. There is no guarding or rebound.   Musculoskeletal:         General: No swelling or deformity.      Cervical back: Normal range of motion and neck supple. No rigidity.      Right lower leg: No edema.      Left lower leg: No edema.   Lymphadenopathy:      Cervical: No cervical adenopathy.   Skin:      General: Skin is warm and dry.   Neurological:      General: No focal deficit present.      Mental Status: He is alert and oriented to person, place, and time.      GCS: GCS eye subscore is 4. GCS verbal subscore is 5. GCS motor subscore is 6.      Cranial Nerves: Cranial nerves 2-12 are intact. No cranial nerve deficit, dysarthria or facial asymmetry.      Sensory: Sensation is intact.      Motor: Motor function is intact.      Coordination: Coordination is intact.       ECG 12 Lead      Date/Time: 7/18/2023 7:00 PM  Performed by: Kaveh Petty MD  Authorized by: Kaveh Petty MD   Interpreted by physician  Rhythm: sinus rhythm  Rate: normal  BPM: 66  QRS axis: right  Conduction: conduction normal  ST Segments: ST segments normal  T depression: aVL  Other: no other findings  Q waves: aVL  Clinical impression: abnormal ECG             ED Course  ED Course as of 07/18/23 2354   Tue Jul 18, 2023   1902 Dr. Polo, teleneurology, consulted. Recommends admit/MRI brain in AM. [SD]      ED Course User Index  [SD] Kaveh Petty MD      Labs Reviewed   COMPREHENSIVE METABOLIC PANEL - Abnormal; Notable for the following components:       Result Value    Glucose 103 (*)     All other components within normal limits    Narrative:     GFR Normal >60  Chronic Kidney Disease <60  Kidney Failure <15     CBC WITH AUTO DIFFERENTIAL - Abnormal; Notable for the following components:    MCV 97.4 (*)     MCH 34.3 (*)     RDW 11.8 (*)     Monocyte % 12.7 (*)     All other components within normal limits   PROTIME-INR - Normal    Narrative:     Therapeutic range for most indications is 2.0-3.0 INR,  or 2.5-3.5 for mechanical heart valves.   APTT - Normal    Narrative:     The recommended Heparin therapeutic range is 68-97 seconds.   TROPONIN - Normal    Narrative:     High Sensitive Troponin T Reference Range:  <10.0 ng/L- Negative Female for AMI  <15.0 ng/L- Negative Male for AMI  >=10 - Abnormal Female indicating possible myocardial  injury.  >=15 - Abnormal Male indicating possible myocardial injury.   Clinicians would have to utilize clinical acumen, EKG, Troponin, and serial changes to determine if it is an Acute Myocardial Infarction or myocardial injury due to an underlying chronic condition.        HIGH SENSITIVITIY TROPONIN T 2HR - Normal    Narrative:     High Sensitive Troponin T Reference Range:  <10.0 ng/L- Negative Female for AMI  <15.0 ng/L- Negative Male for AMI  >=10 - Abnormal Female indicating possible myocardial injury.  >=15 - Abnormal Male indicating possible myocardial injury.   Clinicians would have to utilize clinical acumen, EKG, Troponin, and serial changes to determine if it is an Acute Myocardial Infarction or myocardial injury due to an underlying chronic condition.        POCT GLUCOSE FINGERSTICK - Normal   BASIC METABOLIC PANEL   CBC WITH AUTO DIFFERENTIAL   POCT GLUCOSE FINGERSTICK   CBC AND DIFFERENTIAL    Narrative:     The following orders were created for panel order CBC & Differential.  Procedure                               Abnormality         Status                     ---------                               -----------         ------                     CBC Auto Differential[[id number]]        Abnormal            Final result                 Please view results for these tests on the individual orders.   EXTRA TUBES    Narrative:     The following orders were created for panel order Extra Tubes.  Procedure                               Abnormality         Status                     ---------                               -----------         ------                     Gold Top - SST[[id number]]                                   Final result                 Please view results for these tests on the individual orders.   GOLD TOP - SST   CBC AND DIFFERENTIAL    Narrative:     The following orders were created for panel order CBC & Differential.  Procedure                               Abnormality         Status                      ---------                               -----------         ------                     CBC Auto Differential[[id number]]                                                         Please view results for these tests on the individual orders.     CT Head Without Contrast    Result Date: 7/18/2023  Narrative: INDICATION: cva. COMPARISON: None relevant. TECHNIQUE: Axial images were performed from the calvarium through the skull base followed by 2D multiplanar reformats.  No contrast was administered. FINDINGS: No acute cortical infarction, hemorrhage, extra-axial collection, hydrocephalus, or mass lesion/mass effect.  Sinuses and mastoid air cells are clear.     Impression: No acute intracranial process.     XR Chest 1 View    Result Date: 7/18/2023  Narrative: INDICATION: cva. COMPARISON: None relevant. FINDINGS: Heart size, lungs, and pleural spaces are within normal limits. Sternotomy wires are present.         Interval: baseline  1a. Level of Consciousness: 0-->Alert, keenly responsive  1b. LOC Questions: 0-->Answers both questions correctly  1c. LOC Commands: 0-->Performs both tasks correctly  2. Best Gaze: 0-->Normal  3. Visual: 0-->No visual loss  4. Facial Palsy: 0-->Normal symmetrical movements  5a. Motor Arm, Left: 0-->No drift, limb holds 90 (or 45) degrees for full 10 secs  5b. Motor Arm, Right: 0-->No drift, limb holds 90 (or 45) degrees for full 10 secs  6a. Motor Leg, Left: 0-->No drift, leg holds 30 degree position for full 5 secs  6b. Motor Leg, Right: 0-->No drift, leg holds 30 degree position for full 5 secs  7. Limb Ataxia: 0-->Absent  8. Sensory: 0-->Normal, no sensory loss  9. Best Language: 0-->No aphasia, normal  10. Dysarthria: 0-->Normal  11. Extinction and Inattention (formerly Neglect): 0-->No abnormality    Total (NIH Stroke Scale): 0                                 Medical Decision Making  Problems Addressed:  TIA (transient ischemic attack): complicated acute illness or  injury    Amount and/or Complexity of Data Reviewed  Labs: ordered.  Radiology: ordered.  ECG/medicine tests: ordered and independent interpretation performed.    Risk  OTC drugs.  Prescription drug management.  Decision regarding hospitalization.        Final diagnoses:   TIA (transient ischemic attack)       ED Disposition  ED Disposition       ED Disposition   Decision to Admit    Condition   --    Comment   Level of Care: Stepdown [25]   Diagnosis: TIA (transient ischemic attack) [664030]                 No follow-up provider specified.       Medication List      No changes were made to your prescriptions during this visit.            Kaveh Petty MD  07/18/23 0065       Kaveh Petty MD  07/18/23 5213

## 2023-07-19 ENCOUNTER — APPOINTMENT (OUTPATIENT)
Dept: MRI IMAGING | Facility: HOSPITAL | Age: 68
End: 2023-07-19
Payer: MEDICARE

## 2023-07-19 ENCOUNTER — APPOINTMENT (OUTPATIENT)
Dept: CARDIOLOGY | Facility: HOSPITAL | Age: 68
End: 2023-07-19
Payer: MEDICARE

## 2023-07-19 VITALS
RESPIRATION RATE: 16 BRPM | DIASTOLIC BLOOD PRESSURE: 68 MMHG | HEIGHT: 69 IN | HEART RATE: 72 BPM | TEMPERATURE: 98.6 F | WEIGHT: 196.8 LBS | BODY MASS INDEX: 29.15 KG/M2 | OXYGEN SATURATION: 92 % | SYSTOLIC BLOOD PRESSURE: 126 MMHG

## 2023-07-19 LAB
ANION GAP SERPL CALCULATED.3IONS-SCNC: 7 MMOL/L (ref 5–15)
BASOPHILS # BLD AUTO: 0.05 10*3/MM3 (ref 0–0.2)
BASOPHILS NFR BLD AUTO: 0.9 % (ref 0–1.5)
BH CV ECHO MEAS - ACS: 1.69 CM
BH CV ECHO MEAS - AO MAX PG: 7.2 MMHG
BH CV ECHO MEAS - AO MEAN PG: 4 MMHG
BH CV ECHO MEAS - AO ROOT DIAM: 2.6 CM
BH CV ECHO MEAS - AO V2 MAX: 134 CM/SEC
BH CV ECHO MEAS - AO V2 VTI: 31.3 CM
BH CV ECHO MEAS - AVA(I,D): 2.11 CM2
BH CV ECHO MEAS - EDV(CUBED): 103.9 ML
BH CV ECHO MEAS - EDV(MOD-SP2): 71.2 ML
BH CV ECHO MEAS - EDV(MOD-SP4): 91.4 ML
BH CV ECHO MEAS - EF(MOD-BP): 51.4 %
BH CV ECHO MEAS - EF(MOD-SP2): 41.9 %
BH CV ECHO MEAS - EF(MOD-SP4): 58.1 %
BH CV ECHO MEAS - ESV(CUBED): 23.1 ML
BH CV ECHO MEAS - ESV(MOD-SP2): 41.4 ML
BH CV ECHO MEAS - ESV(MOD-SP4): 38.3 ML
BH CV ECHO MEAS - FS: 39.4 %
BH CV ECHO MEAS - IVS/LVPW: 0.97 CM
BH CV ECHO MEAS - IVSD: 0.99 CM
BH CV ECHO MEAS - LA DIMENSION: 5.3 CM
BH CV ECHO MEAS - LAT PEAK E' VEL: 7.8 CM/SEC
BH CV ECHO MEAS - LV DIASTOLIC VOL/BSA (35-75): 44.7 CM2
BH CV ECHO MEAS - LV MASS(C)D: 166.1 GRAMS
BH CV ECHO MEAS - LV MAX PG: 2.8 MMHG
BH CV ECHO MEAS - LV MEAN PG: 1.48 MMHG
BH CV ECHO MEAS - LV SYSTOLIC VOL/BSA (12-30): 18.7 CM2
BH CV ECHO MEAS - LV V1 MAX: 84.3 CM/SEC
BH CV ECHO MEAS - LV V1 VTI: 20.5 CM
BH CV ECHO MEAS - LVIDD: 4.7 CM
BH CV ECHO MEAS - LVIDS: 2.8 CM
BH CV ECHO MEAS - LVOT AREA: 3.2 CM2
BH CV ECHO MEAS - LVOT DIAM: 2.03 CM
BH CV ECHO MEAS - LVPWD: 1.02 CM
BH CV ECHO MEAS - MED PEAK E' VEL: 8.4 CM/SEC
BH CV ECHO MEAS - MR MAX PG: 49 MMHG
BH CV ECHO MEAS - MR MAX VEL: 349.9 CM/SEC
BH CV ECHO MEAS - MV A MAX VEL: 67.7 CM/SEC
BH CV ECHO MEAS - MV DEC SLOPE: 323.8 CM/SEC2
BH CV ECHO MEAS - MV DEC TIME: 0.21 MSEC
BH CV ECHO MEAS - MV E MAX VEL: 102 CM/SEC
BH CV ECHO MEAS - MV E/A: 1.51
BH CV ECHO MEAS - MV MAX PG: 3.5 MMHG
BH CV ECHO MEAS - MV MEAN PG: 1.52 MMHG
BH CV ECHO MEAS - MV P1/2T: 85.6 MSEC
BH CV ECHO MEAS - MV V2 VTI: 26.7 CM
BH CV ECHO MEAS - MVA(P1/2T): 2.6 CM2
BH CV ECHO MEAS - MVA(VTI): 2.48 CM2
BH CV ECHO MEAS - PA V2 MAX: 129 CM/SEC
BH CV ECHO MEAS - PI END-D VEL: 116.9 CM/SEC
BH CV ECHO MEAS - RAP SYSTOLE: 3 MMHG
BH CV ECHO MEAS - RVDD: 2.7 CM
BH CV ECHO MEAS - RVSP: 27.5 MMHG
BH CV ECHO MEAS - SI(MOD-SP2): 14.6 ML/M2
BH CV ECHO MEAS - SI(MOD-SP4): 26 ML/M2
BH CV ECHO MEAS - SV(LVOT): 66.2 ML
BH CV ECHO MEAS - SV(MOD-SP2): 29.8 ML
BH CV ECHO MEAS - SV(MOD-SP4): 53.1 ML
BH CV ECHO MEAS - TAPSE (>1.6): 1.86 CM
BH CV ECHO MEAS - TR MAX PG: 24.5 MMHG
BH CV ECHO MEAS - TR MAX VEL: 247.2 CM/SEC
BH CV ECHO MEASUREMENTS AVERAGE E/E' RATIO: 12.59
BH CV ECHO SHUNT ASSESSMENT PERFORMED (HIDDEN SCRIPTING): 1
BUN SERPL-MCNC: 8 MG/DL (ref 8–23)
BUN/CREAT SERPL: 8.7 (ref 7–25)
CALCIUM SPEC-SCNC: 8.6 MG/DL (ref 8.6–10.5)
CHLORIDE SERPL-SCNC: 106 MMOL/L (ref 98–107)
CHOLEST SERPL-MCNC: 118 MG/DL (ref 0–200)
CO2 SERPL-SCNC: 27 MMOL/L (ref 22–29)
CREAT SERPL-MCNC: 0.92 MG/DL (ref 0.76–1.27)
DEPRECATED RDW RBC AUTO: 41 FL (ref 37–54)
EGFRCR SERPLBLD CKD-EPI 2021: 90.6 ML/MIN/1.73
EOSINOPHIL # BLD AUTO: 0.23 10*3/MM3 (ref 0–0.4)
EOSINOPHIL NFR BLD AUTO: 4.2 % (ref 0.3–6.2)
ERYTHROCYTE [DISTWIDTH] IN BLOOD BY AUTOMATED COUNT: 11.8 % (ref 12.3–15.4)
GLUCOSE SERPL-MCNC: 107 MG/DL (ref 65–99)
HBA1C MFR BLD: 5.6 % (ref 4.8–5.6)
HCT VFR BLD AUTO: 41 % (ref 37.5–51)
HDLC SERPL-MCNC: 39 MG/DL (ref 40–60)
HGB BLD-MCNC: 13.9 G/DL (ref 13–17.7)
IMM GRANULOCYTES # BLD AUTO: 0.03 10*3/MM3 (ref 0–0.05)
IMM GRANULOCYTES NFR BLD AUTO: 0.5 % (ref 0–0.5)
LDLC SERPL CALC-MCNC: 62 MG/DL (ref 0–100)
LDLC/HDLC SERPL: 1.56 {RATIO}
LEFT ATRIUM VOLUME INDEX: 33.6 ML/M2
LYMPHOCYTES # BLD AUTO: 1.73 10*3/MM3 (ref 0.7–3.1)
LYMPHOCYTES NFR BLD AUTO: 31.5 % (ref 19.6–45.3)
MCH RBC QN AUTO: 32.6 PG (ref 26.6–33)
MCHC RBC AUTO-ENTMCNC: 33.9 G/DL (ref 31.5–35.7)
MCV RBC AUTO: 96.2 FL (ref 79–97)
MONOCYTES # BLD AUTO: 0.7 10*3/MM3 (ref 0.1–0.9)
MONOCYTES NFR BLD AUTO: 12.7 % (ref 5–12)
NEUTROPHILS NFR BLD AUTO: 2.76 10*3/MM3 (ref 1.7–7)
NEUTROPHILS NFR BLD AUTO: 50.2 % (ref 42.7–76)
NRBC BLD AUTO-RTO: 0 /100 WBC (ref 0–0.2)
PLATELET # BLD AUTO: 152 10*3/MM3 (ref 140–450)
PMV BLD AUTO: 9.6 FL (ref 6–12)
POTASSIUM SERPL-SCNC: 4.3 MMOL/L (ref 3.5–5.2)
QT INTERVAL: 402 MS
QTC INTERVAL: 421 MS
RBC # BLD AUTO: 4.26 10*6/MM3 (ref 4.14–5.8)
SODIUM SERPL-SCNC: 140 MMOL/L (ref 136–145)
TRIGL SERPL-MCNC: 90 MG/DL (ref 0–150)
VLDLC SERPL-MCNC: 17 MG/DL (ref 5–40)
WBC NRBC COR # BLD: 5.5 10*3/MM3 (ref 3.4–10.8)

## 2023-07-19 PROCEDURE — 70547 MR ANGIOGRAPHY NECK W/O DYE: CPT

## 2023-07-19 PROCEDURE — 93306 TTE W/DOPPLER COMPLETE: CPT

## 2023-07-19 PROCEDURE — 25010000002 LORAZEPAM PER 2 MG

## 2023-07-19 PROCEDURE — G0378 HOSPITAL OBSERVATION PER HR: HCPCS

## 2023-07-19 PROCEDURE — 80048 BASIC METABOLIC PNL TOTAL CA: CPT | Performed by: HOSPITALIST

## 2023-07-19 PROCEDURE — 70551 MRI BRAIN STEM W/O DYE: CPT

## 2023-07-19 PROCEDURE — 85025 COMPLETE CBC W/AUTO DIFF WBC: CPT | Performed by: HOSPITALIST

## 2023-07-19 PROCEDURE — 96361 HYDRATE IV INFUSION ADD-ON: CPT

## 2023-07-19 PROCEDURE — 99204 OFFICE O/P NEW MOD 45 MIN: CPT | Performed by: NURSE PRACTITIONER

## 2023-07-19 PROCEDURE — 96374 THER/PROPH/DIAG INJ IV PUSH: CPT

## 2023-07-19 PROCEDURE — 70544 MR ANGIOGRAPHY HEAD W/O DYE: CPT

## 2023-07-19 PROCEDURE — 93306 TTE W/DOPPLER COMPLETE: CPT | Performed by: INTERNAL MEDICINE

## 2023-07-19 PROCEDURE — 80061 LIPID PANEL: CPT | Performed by: NURSE PRACTITIONER

## 2023-07-19 PROCEDURE — 83036 HEMOGLOBIN GLYCOSYLATED A1C: CPT | Performed by: NURSE PRACTITIONER

## 2023-07-19 RX ORDER — CLOPIDOGREL BISULFATE 75 MG/1
75 TABLET ORAL DAILY
Qty: 21 TABLET | Refills: 0 | Status: SHIPPED | OUTPATIENT
Start: 2023-07-20

## 2023-07-19 RX ORDER — CLOPIDOGREL BISULFATE 75 MG/1
75 TABLET ORAL DAILY
Status: DISCONTINUED | OUTPATIENT
Start: 2023-07-20 | End: 2023-07-19 | Stop reason: HOSPADM

## 2023-07-19 RX ORDER — CLOPIDOGREL BISULFATE 75 MG/1
300 TABLET ORAL ONCE
Status: COMPLETED | OUTPATIENT
Start: 2023-07-19 | End: 2023-07-19

## 2023-07-19 RX ORDER — LORAZEPAM 2 MG/ML
1 INJECTION INTRAMUSCULAR EVERY 4 HOURS PRN
Status: DISCONTINUED | OUTPATIENT
Start: 2023-07-19 | End: 2023-07-19 | Stop reason: HOSPADM

## 2023-07-19 RX ADMIN — Medication 10 ML: at 10:18

## 2023-07-19 RX ADMIN — Medication 500 MCG: at 11:32

## 2023-07-19 RX ADMIN — LORAZEPAM 1 MG: 2 INJECTION INTRAMUSCULAR; INTRAVENOUS at 07:35

## 2023-07-19 RX ADMIN — ROSUVASTATIN CALCIUM 40 MG: 20 TABLET, FILM COATED ORAL at 10:14

## 2023-07-19 RX ADMIN — PANTOPRAZOLE SODIUM 40 MG: 40 TABLET, DELAYED RELEASE ORAL at 10:15

## 2023-07-19 RX ADMIN — RANOLAZINE 1000 MG: 500 TABLET, FILM COATED, EXTENDED RELEASE ORAL at 10:14

## 2023-07-19 RX ADMIN — Medication 50 MCG: at 11:32

## 2023-07-19 RX ADMIN — ASPIRIN 81 MG: 81 TABLET, COATED ORAL at 10:15

## 2023-07-19 RX ADMIN — CLOPIDOGREL BISULFATE 300 MG: 75 TABLET ORAL at 16:33

## 2023-07-19 RX ADMIN — SODIUM CHLORIDE 125 ML/HR: 9 INJECTION, SOLUTION INTRAVENOUS at 11:40

## 2023-07-19 RX ADMIN — LEVOTHYROXINE SODIUM 50 MCG: 50 TABLET ORAL at 10:15

## 2023-07-19 NOTE — CONSULTS
Teleneurology Consultation Note - Phone    Date of Consultation: 07/18/2023 19:01 CDT  Reason for consult: Left sided numbness  Location: ED  ED arrival Date/Time: 07/18/2023 18:16 CDT  Date/Time Telestroke doctor on phone: 07/18/2023 19:01 CDT  Reason video not used: Provider to provider discussion  Date/time telestroke doctor assessment: 07/18/2023 19:30 CDT  Diagnosis: TIA    Patient Demographics:  Age: 68  Gender: Male    Assessment:  R/o stroke  Could be TIA    tPA Given: No  tPA exclusion reason: Non-disabling stroke (NIHSS must be &lt;/= 5 and symptoms should not be disabling)  Is this patient candidate for dual antiplatelet? No    Recommendation:  - Admit to Teleunit  - MRI brain without contrast  - TTE (bubble study only required for patient < 60 years unless specified by Neurologist)  - Check EKG and troponin  - Lipid profile and A1C  - B12, Folate, UA  - Hold home antihypertensive except beta blocker. Can continue low dose beta blocker.  - Permissive hypertension up to 220/110 mmhg. PRN labetalol for SBP > 220 mmhg  - IVF with 0.9% NS at 75 cc/hour  - Aspirin 325 mg daily. Consider 300 mg AZ if patient can't tolerate PO  - Crestor 20 mg daily and adjust according to LDL level  - Stroke education to patient and family  - PT/OT and speech evaluation and treatment  - Keep POC glucose between < 180. Avoid hypoglycemia.  - Keep Normothermic. PRN tylenol of T > 100.4.  - DVT PPX  - Bedside RN dysphagia screen before starting any oral intake    Follow-up recommendations: In this patient with acute neurologic deficits, we would recommend that the inpatient neurology team be consulted for further ongoing recommendations. Please be sure to place the consult through EMR. Please call us at [employer] # [phone] and press 1 for emergent and press 2 for non-emergent if you have any questions.    Updated ED provider with recommendations at: 07/18/2023 20:30 CDT      History of Presenting Illness:  69 yo male presented  with 45 min of confusion, left arm numbness left ear ringing, now resolved. no weakness in legs noted. NIHSs 0 in ED right now  Allergies: Reviewed in EMR    Pertinent medication prior to arrival:   Antiplatelet prior to arrival? None  Anticoagulation prior to arrival? None    Personal review of CNS Imaging:  CTH performed? Yes  CT Interpretation date and time: 07/18/2023 19:30 CDT  Hemorrhage vs non- hemorrhage? No acute intracranial hemorrhage  Type of No acute intracranial hemorrhage:  Right hemisphere  ASPECT SCORE: 10  Caudate: 1  Lentiform nucleus: 1  Insula: 1  Internal capsule: 1  M1: 1  M2: 1  M3: 1  M4: 1  M5: 1  M6: 1    CTA head and neck performed? Yes  CTA review date and time: 07/18/2023 19:30 CDT  Results of CTA: No intracranial or extracranial large vessel occlusion or significant stenosis    Large Vessel Occlusion:  LVO present? No  LVO exclusion reason: No LVO present      Teleneurology patient verification & consent    I, Paul Polo MD, was consulted about this patient on 07/18/2023, 19:01 CDT for discussion over the phone regarding management of the patient's care via a provider to provider discussion for 20 minutes which includes reviewing medical records, reviewing laboratory data, radiology, personal review of CNS images and other diagnostic tests as well as clinical documentation. My location was [place]. The recommendations are based on information I received from the consulting provider.

## 2023-07-19 NOTE — ED NOTES
Nursing report ED to floor  Lawrence Danielson  68 y.o.  male    HPI:   Chief Complaint   Patient presents with    Numbness    Dizziness       Admitting doctor:   No admitting provider for patient encounter.    Consulting provider(s):  Consults       Date and Time Order Name Status Description    7/18/2023  7:04 PM Hospitalist (on-call MD unless specified)      7/18/2023  7:04 PM Inpatient Neurology Consult Stroke               Admitting diagnosis:   The encounter diagnosis was TIA (transient ischemic attack).    Code status:   Current Code Status       Date Active Code Status Order ID Comments User Context       7/18/2023 2039 CPR (Attempt to Resuscitate) [id number]  Juan Melara MD ED        Question Answer    Code Status (Patient has no pulse and is not breathing) CPR (Attempt to Resuscitate)    Medical Interventions (Patient has pulse or is breathing) Full Support    Release to patient Routine Release                    Allergies:   Contrast dye (echo or unknown ct/mr) and Shellfish-derived products    Intake and Output    Intake/Output Summary (Last 24 hours) at 7/19/2023 0535  Last data filed at 7/19/2023 0512  Gross per 24 hour   Intake 1274.99 ml   Output 1700 ml   Net -425.01 ml       Weight:       07/18/23 1817   Weight: 88.5 kg (195 lb)       Most recent vitals:   Vitals:    07/19/23 0126 07/19/23 0133 07/19/23 0136 07/19/23 0522   BP: 104/57   132/73   BP Location:    Left arm   Patient Position:    Lying   Pulse: 64 70  73   Resp:   16 16   Temp:   97.8 °F (36.6 °C) 97.5 °F (36.4 °C)   TempSrc:   Oral Oral   SpO2:  98%  98%   Weight:       Height:         Oxygen Therapy: Room air    Active LDAs/IV Access:   Lines, Drains & Airways       Active LDAs       Name Placement date Placement time Site Days    Peripheral IV 07/18/23 1859 Right Antecubital 07/18/23 1859  Antecubital  less than 1                    Labs (abnormal labs have a star):   Labs Reviewed   COMPREHENSIVE METABOLIC PANEL -  Abnormal; Notable for the following components:       Result Value    Glucose 103 (*)     All other components within normal limits    Narrative:     GFR Normal >60  Chronic Kidney Disease <60  Kidney Failure <15     CBC WITH AUTO DIFFERENTIAL - Abnormal; Notable for the following components:    MCV 97.4 (*)     MCH 34.3 (*)     RDW 11.8 (*)     Monocyte % 12.7 (*)     All other components within normal limits   PROTIME-INR - Normal    Narrative:     Therapeutic range for most indications is 2.0-3.0 INR,  or 2.5-3.5 for mechanical heart valves.   APTT - Normal    Narrative:     The recommended Heparin therapeutic range is 68-97 seconds.   TROPONIN - Normal    Narrative:     High Sensitive Troponin T Reference Range:  <10.0 ng/L- Negative Female for AMI  <15.0 ng/L- Negative Male for AMI  >=10 - Abnormal Female indicating possible myocardial injury.  >=15 - Abnormal Male indicating possible myocardial injury.   Clinicians would have to utilize clinical acumen, EKG, Troponin, and serial changes to determine if it is an Acute Myocardial Infarction or myocardial injury due to an underlying chronic condition.        HIGH SENSITIVITIY TROPONIN T 2HR - Normal    Narrative:     High Sensitive Troponin T Reference Range:  <10.0 ng/L- Negative Female for AMI  <15.0 ng/L- Negative Male for AMI  >=10 - Abnormal Female indicating possible myocardial injury.  >=15 - Abnormal Male indicating possible myocardial injury.   Clinicians would have to utilize clinical acumen, EKG, Troponin, and serial changes to determine if it is an Acute Myocardial Infarction or myocardial injury due to an underlying chronic condition.        POCT GLUCOSE FINGERSTICK - Normal   BASIC METABOLIC PANEL   CBC WITH AUTO DIFFERENTIAL   POCT GLUCOSE FINGERSTICK   CBC AND DIFFERENTIAL    Narrative:     The following orders were created for panel order CBC & Differential.  Procedure                               Abnormality         Status                      ---------                               -----------         ------                     CBC Auto Differential[[id number]]        Abnormal            Final result                 Please view results for these tests on the individual orders.   EXTRA TUBES    Narrative:     The following orders were created for panel order Extra Tubes.  Procedure                               Abnormality         Status                     ---------                               -----------         ------                     Gold Top - SST[[id number]]                                   Final result                 Please view results for these tests on the individual orders.   GOLD TOP - SST   CBC AND DIFFERENTIAL    Narrative:     The following orders were created for panel order CBC & Differential.  Procedure                               Abnormality         Status                     ---------                               -----------         ------                     CBC Auto Differential[[id number]]                                                         Please view results for these tests on the individual orders.       Meds given in ED:   Medications   sodium chloride 0.9 % flush 10 mL (has no administration in time range)   sodium chloride 0.9 % infusion (125 mL/hr Intravenous Currently Infusing 7/19/23 0512)   folic acid (FOLVITE) half tablet 500 mcg (has no administration in time range)   vitamin B-12 (CYANOCOBALAMIN) tablet 50 mcg (has no administration in time range)   aspirin EC tablet 81 mg (has no administration in time range)   rosuvastatin (CRESTOR) tablet 40 mg (has no administration in time range)   levothyroxine (SYNTHROID, LEVOTHROID) tablet 50 mcg (has no administration in time range)   ranolazine (RANEXA) 12 hr tablet 1,000 mg (1,000 mg Oral Given 7/18/23 6305)   pantoprazole (PROTONIX) EC tablet 40 mg (has no administration in time range)   sodium chloride 0.9 % flush 10 mL (10 mL Intravenous Not Given 7/18/23  4176)   sodium chloride 0.9 % flush 10 mL (has no administration in time range)   sodium chloride 0.9 % infusion 40 mL (has no administration in time range)   sennosides-docusate (PERICOLACE) 8.6-50 MG per tablet 2 tablet (has no administration in time range)     And   polyethylene glycol (MIRALAX) packet 17 g (has no administration in time range)     And   bisacodyl (DULCOLAX) EC tablet 5 mg (has no administration in time range)     And   bisacodyl (DULCOLAX) suppository 10 mg (has no administration in time range)   Enoxaparin Sodium (LOVENOX) syringe 40 mg (40 mg Subcutaneous Not Given 7/19/23 0051)   Potassium Replacement - Follow Nurse / BPA Driven Protocol (has no administration in time range)   Magnesium Standard Dose Replacement - Follow Nurse / BPA Driven Protocol (has no administration in time range)   Phosphorus Replacement - Follow Nurse / BPA Driven Protocol (has no administration in time range)   Calcium Replacement - Follow Nurse / BPA Driven Protocol (has no administration in time range)   aspirin EC tablet 325 mg (325 mg Oral Given 7/18/23 1926)   rosuvastatin (CRESTOR) tablet 20 mg (20 mg Oral Given 7/18/23 1926)     sodium chloride, 125 mL/hr, Last Rate: 125 mL/hr (07/19/23 0512)         NIH Stroke Scale:  Interval: baseline    Isolation/Infection(s):  No active isolations   No active infections     COVID Testing  Collected no  Resulted no    Nursing report ED to floor:  Mental status: A&O x 4  Ambulatory status: Independent  Precautions: N/A    ED nurse phone extentsion- [phone]

## 2023-07-19 NOTE — H&P
[facility] Medicine Admission      Date of Admission: 7/18/2023      Primary Care Physician: Provider, No Known      Chief Complaint: Left-sided weakness    HPI: Patient is a 68-year-old male with past medical history of chronic systolic congestive heart failure, coronary artery disease, hypothyroidism, and GERD who presented to the emergency department today with left arm weakness and change in sensation coupled with intermittent confusion and the left ear ringing that lasted approximately 45 minutes in total.  Patient states that symptoms were sudden in onset while he was eating and resolved almost completely within 45 minutes.  At the time of my interview he states that the only symptom he has is ringing in his left ear.  He states that he traditionally has ringing in his right ear only but since the onset of this episode he has had ringing in his left ear and it has been more intense than baseline.    Concurrent Medical History:  has a past medical history of CHF (congestive heart failure), Coronary artery disease, Disease of thyroid gland, GERD (gastroesophageal reflux disease), and High cholesterol.    Past Surgical History:  has a past surgical history that includes Cardiac surgery; Hernia repair; Cardiac catheterization (N/A, 6/28/2019); Coronary artery bypass graft; Cardiac catheterization (N/A, 8/12/2019); Cardiac catheterization (N/A, 9/30/2019); and Cardiac catheterization (Left, 8/20/2020).    Family History: family history includes Colon cancer in his father; No Known Problems in his mother.     Social History:  reports that he has quit smoking. He has never used smokeless tobacco. He reports current alcohol use. He reports that he does not use drugs.    Allergies:   Allergies   Allergen Reactions    Contrast Dye (Echo Or Unknown Ct/Mr) Anaphylaxis    Shellfish-Derived Products Anaphylaxis       Medications:   Prior to Admission medications     Medication Sig Start Date End Date Taking? Authorizing Provider   aspirin 81 MG EC tablet Take 1 tablet by mouth Daily.   Yes Long Cope MD   folic acid (FOLVITE) 400 MCG tablet Take 1 tablet by mouth Daily.   Yes Long Cope MD   levothyroxine (SYNTHROID, LEVOTHROID) 25 MCG tablet Take 2 tablets by mouth Daily.   Yes Long Cope MD   metoprolol tartrate (LOPRESSOR) 25 MG tablet Take 12.5 mg by mouth 2 (Two) Times a Day.   Yes Long Cope MD   pantoprazole (PROTONIX) 40 MG EC tablet Take 1 tablet by mouth Daily. 6/9/23  Yes Long Cope MD   ranolazine (RANEXA) 500 MG 12 hr tablet Take 2 tablets by mouth 2 (Two) Times a Day. 8/20/20  Yes Patricio Barnard MD   rosuvastatin (CRESTOR) 10 MG tablet Take 4 tablets by mouth Daily.   Yes Long Cope MD   vitamin B-12 (CYANOCOBALAMIN) 100 MCG tablet Take 50 mcg by mouth Daily.   Yes Long Cope MD   clopidogrel (PLAVIX) 75 MG tablet Take 75 mg by mouth Daily.  7/18/23  Long Cope MD   omeprazole (priLOSEC) 20 MG capsule Take 20 mg by mouth Daily.  7/18/23  Long Cope MD     I have utilized all available immediate resources to obtain, update, or review the patient's current medications (including all prescriptions, over-the-counter products, herbals, cannabis/cannabidiol products, and vitamin/mineral/dietary (nutritional) supplements).      Review of Systems:  Review of Systems   Constitutional:  Negative for appetite change, chills, fatigue, fever and unexpected weight change.   HENT:  Positive for tinnitus (left greater than right). Negative for congestion, facial swelling, hearing loss, nosebleeds, rhinorrhea, sneezing, trouble swallowing and voice change.    Eyes:  Negative for photophobia and visual disturbance.   Respiratory:  Negative for apnea, cough, choking, chest tightness, shortness of breath, wheezing and stridor.    Cardiovascular:  Negative for chest pain,  palpitations and leg swelling.   Gastrointestinal:  Negative for abdominal pain, blood in stool, constipation, diarrhea, nausea and vomiting.   Endocrine: Negative for cold intolerance, heat intolerance, polydipsia, polyphagia and polyuria.   Genitourinary:  Negative for dysuria, flank pain and hematuria.   Musculoskeletal:  Negative for arthralgias, back pain, myalgias and neck pain.   Skin:  Negative for rash and wound.   Allergic/Immunologic: Negative for immunocompromised state.   Neurological:  Positive for weakness (left arm). Negative for dizziness, seizures, syncope, speech difficulty, light-headedness, numbness and headaches.        Left arm change in sensation   Hematological:  Does not bruise/bleed easily.   Psychiatric/Behavioral:  Negative for agitation, behavioral problems, confusion, decreased concentration, hallucinations, self-injury and suicidal ideas. The patient is not nervous/anxious.     Otherwise complete ROS is negative except as mentioned above.    Physical Exam:   Temp:  [97.9 °F (36.6 °C)] 97.9 °F (36.6 °C)  Heart Rate:  [67-68] 68  Resp:  [18-20] 18  BP: (132-144)/(77-87) 144/77  Physical Exam  Constitutional:       Appearance: He is well-developed.   HENT:      Head: Normocephalic and atraumatic.      Nose: Nose normal.   Eyes:      General: Lids are normal. No scleral icterus.     Conjunctiva/sclera: Conjunctivae normal.      Pupils: Pupils are equal, round, and reactive to light.   Neck:      Vascular: No JVD.      Trachea: No tracheal tenderness or tracheal deviation.   Cardiovascular:      Rate and Rhythm: Normal rate and regular rhythm.      Pulses: Normal pulses.      Heart sounds: Normal heart sounds, S1 normal and S2 normal. No murmur heard.    No friction rub. No gallop.   Pulmonary:      Effort: Pulmonary effort is normal. No accessory muscle usage or respiratory distress.      Breath sounds: Normal breath sounds. No decreased breath sounds, wheezing or rales.   Chest:       Chest wall: No tenderness.   Abdominal:      General: Bowel sounds are normal. There is no distension.      Palpations: Abdomen is soft. There is no mass.      Tenderness: There is no abdominal tenderness. There is no guarding or rebound.   Musculoskeletal:         General: No tenderness.      Cervical back: Normal range of motion and neck supple. No spinous process tenderness.   Skin:     General: Skin is warm.      Coloration: Skin is not pale.      Findings: No rash.   Neurological:      Mental Status: He is alert and oriented to person, place, and time.      Cranial Nerves: No cranial nerve deficit.      Sensory: No sensory deficit.      Motor: No atrophy, abnormal muscle tone or seizure activity.      Coordination: Coordination normal.      Deep Tendon Reflexes: Reflexes are normal and symmetric. Reflexes normal.   Psychiatric:         Behavior: Behavior normal.         Thought Content: Thought content normal.         Judgment: Judgment normal.         Results Reviewed:  I have personally reviewed current lab, radiology, and data and agree with results.  Lab Results (last 24 hours)       Procedure Component Value Units Date/Time    Extra Tubes [[id number]] Collected: 07/18/23 1859    Specimen: Blood, Venous Line Updated: 07/18/23 2000    Narrative:      The following orders were created for panel order Extra Tubes.  Procedure                               Abnormality         Status                     ---------                               -----------         ------                     Gold Top - SST[[id number]]                                   Final result                 Please view results for these tests on the individual orders.    Gold Top - SST [[id number]] Collected: 07/18/23 1859    Specimen: Blood Updated: 07/18/23 2000     Extra Tube Hold for add-ons.     Comment: Auto resulted.       Comprehensive Metabolic Panel [[id number]]  (Abnormal) Collected: 07/18/23 1859    Specimen: Blood Updated: 07/18/23  1947     Glucose 103 mg/dL      BUN 9 mg/dL      Creatinine 1.06 mg/dL      Sodium 140 mmol/L      Potassium 4.7 mmol/L      Comment: Slight hemolysis detected by analyzer. Results may be affected.        Chloride 102 mmol/L      CO2 28.0 mmol/L      Calcium 9.3 mg/dL      Total Protein 7.3 g/dL      Albumin 4.3 g/dL      ALT (SGPT) 20 U/L      AST (SGOT) 18 U/L      Comment: Slight hemolysis detected by analyzer. Results may be affected.        Alkaline Phosphatase 75 U/L      Total Bilirubin 0.4 mg/dL      Globulin 3.0 gm/dL      A/G Ratio 1.4 g/dL      BUN/Creatinine Ratio 8.5     Anion Gap 10.0 mmol/L      eGFR 76.4 mL/min/1.73     Narrative:      GFR Normal >60  Chronic Kidney Disease <60  Kidney Failure <15      High Sensitivity Troponin T [[id number]]  (Normal) Collected: 07/18/23 1859    Specimen: Blood Updated: 07/18/23 1947     HS Troponin T 7 ng/L     Narrative:      High Sensitive Troponin T Reference Range:  <10.0 ng/L- Negative Female for AMI  <15.0 ng/L- Negative Male for AMI  >=10 - Abnormal Female indicating possible myocardial injury.  >=15 - Abnormal Male indicating possible myocardial injury.   Clinicians would have to utilize clinical acumen, EKG, Troponin, and serial changes to determine if it is an Acute Myocardial Infarction or myocardial injury due to an underlying chronic condition.         aPTT [[id number]]  (Normal) Collected: 07/18/23 1859    Specimen: Blood Updated: 07/18/23 1929     PTT 22.0 seconds     Narrative:      The recommended Heparin therapeutic range is 68-97 seconds.    Protime-INR [[id number]]  (Normal) Collected: 07/18/23 1859    Specimen: Blood Updated: 07/18/23 1929     Protime 12.7 Seconds      INR 0.95    Narrative:      Therapeutic range for most indications is 2.0-3.0 INR,  or 2.5-3.5 for mechanical heart valves.    CBC & Differential [[id number]]  (Abnormal) Collected: 07/18/23 1859    Specimen: Blood Updated: 07/18/23 1913    Narrative:      The following orders were  created for panel order CBC & Differential.  Procedure                               Abnormality         Status                     ---------                               -----------         ------                     CBC Auto Differential[[id number]]        Abnormal            Final result                 Please view results for these tests on the individual orders.    CBC Auto Differential [[id number]]  (Abnormal) Collected: 07/18/23 1859    Specimen: Blood Updated: 07/18/23 1913     WBC 6.59 10*3/mm3      RBC 4.29 10*6/mm3      Hemoglobin 14.7 g/dL      Hematocrit 41.8 %      MCV 97.4 fL      MCH 34.3 pg      MCHC 35.2 g/dL      RDW 11.8 %      RDW-SD 42.0 fl      MPV 10.1 fL      Platelets 190 10*3/mm3      Neutrophil % 46.6 %      Lymphocyte % 34.0 %      Monocyte % 12.7 %      Eosinophil % 5.2 %      Basophil % 1.2 %      Immature Grans % 0.3 %      Neutrophils, Absolute 3.07 10*3/mm3      Lymphocytes, Absolute 2.24 10*3/mm3      Monocytes, Absolute 0.84 10*3/mm3      Eosinophils, Absolute 0.34 10*3/mm3      Basophils, Absolute 0.08 10*3/mm3      Immature Grans, Absolute 0.02 10*3/mm3      nRBC 0.0 /100 WBC     POC Glucose Once [[id number]]  (Normal) Collected: 07/18/23 1824    Specimen: Blood Updated: 07/18/23 1836     Glucose 96 mg/dL      Comment: : [id number] FIGHT MADISONMeter ID: [id number]             Imaging Results (Last 24 Hours)       Procedure Component Value Units Date/Time    XR Chest 1 View [[id number]] Collected: 07/18/23 1844     Updated: 07/18/23 2004    Narrative:      INDICATION:  cva.    COMPARISON:  None relevant.    FINDINGS:  Heart size, lungs, and pleural spaces are within normal limits.    Sternotomy wires are present.      CT Head Without Contrast [[id number]] Collected: 07/18/23 1845     Updated: 07/18/23 1950    Narrative:      INDICATION:  cva.    COMPARISON:  None relevant.    TECHNIQUE:  Axial images were performed from the calvarium through the skull base followed  by 2D  multiplanar reformats.  No contrast was administered.    FINDINGS:  No acute cortical infarction, hemorrhage, extra-axial collection, hydrocephalus,  or mass lesion/mass effect.  Sinuses and mastoid air cells are clear.      Impression:      No acute intracranial process.                      Assessment:    Active Hospital Problems    Diagnosis     **TIA (transient ischemic attack)              Plan:  1.  TIA: Patient with symptoms that are most likely a TIA.  Small lacunar infarct with resolved berta-infarct symptoms cannot be excluded.  Noncontrasted CT is unremarkable.  Patient has documented anaphylactic reaction to contrast.  Will get MRI tomorrow.  Teleneurology consulted by emergency department.  2.  Coronary artery disease: Patient states he has had open heart surgery and multiple stent placements since that time.  Asymptomatic.  Continue home medication except we will hold Lopressor at this point.  3.  Chronic systolic congestive heart failure: No acute exacerbation.  We will repeat echocardiography.  4.  Hypothyroidism: Continue Synthroid.  5.  GERD: Continue Protonix.  5.  DVT prophylaxis: Lovenox.    Medical Decision Making  Number and Complexity of problems: 1 highly complex medical problem    Conditions and Status:        Condition is unchanged.     [facility] Data  External documents reviewed: Outpatient notes  My EKG interpretation: Normal sinus rhythm, normal axis  My plain film interpretation: No acute cardiopulmonary disease, sternal wires present     Discussed with: Patient and spouse     Treatment Plan  As above    Care Planning  Shared decision making: Patient in agreement with plan of care  Code status and discussions: Full code    Disposition  Social Determinants of Health that impact treatment or disposition: None  I expect the patient to be discharged to home in 1-2 days.      I confirmed that the patient's Advance Care Plan is present, code status is documented, or surrogate decision maker is  listed in the patient's medical record.          This document has been electronically signed by Juan Melara MD on July 18, 2023 20:32 CDT

## 2023-07-19 NOTE — PROGRESS NOTES
Stroke Progress Note       Chief Complaint:  Left arm decreased sensation and dizziness    Subjective     HPI: Pt is a 68-yr-old right-handed white male with known diagnoses of CABG who presented after sudden onset of left arm decreased sensation, dizziness, and ringing in both ears. Pt states symptoms lasted around 45 minutes. Today he states being back to his baseline. Strengths are equal 5/5, denies numbness, and visual field is intact.       Review of Systems   HENT: Negative.     Eyes: Negative.    Respiratory: Negative.     Cardiovascular: Negative.    Gastrointestinal: Negative.    Genitourinary: Negative.    Musculoskeletal: Negative.    Skin: Negative.    Neurological: Negative.    Psychiatric/Behavioral: Negative.        Objective      Temp:  [97.5 °F (36.4 °C)-97.9 °F (36.6 °C)] 97.5 °F (36.4 °C)  Heart Rate:  [62-76] 65  Resp:  [16-20] 16  BP: (104-168)/(57-87) 142/74    Neurological Exam  Mental Status  Alert. Oriented to person, place, time and situation. Oriented to person, place, and time. Speech is normal. Language is fluent with no aphasia.    Cranial Nerves  CN II: Visual acuity is normal. Visual fields full to confrontation.  CN III, IV, VI: Extraocular movements intact bilaterally. Normal lids and orbits bilaterally. Pupils equal round and reactive to light bilaterally.  CN V: Facial sensation is normal.  CN VII: Full and symmetric facial movement.  CN IX, X: Palate elevates symmetrically. Normal gag reflex.  CN XI: Shoulder shrug strength is normal.  CN XII: Tongue midline without atrophy or fasciculations.    Motor  Normal muscle bulk throughout. No fasciculations present. Strength is 5/5 throughout all four extremities.    Sensory  Sensation is intact to light touch, pinprick, vibration and proprioception in all four extremities.    Reflexes  Not assessed.    Coordination    Finger-to-nose, rapid alternating movements and heel-to-shin normal bilaterally without dysmetria.    Gait  Normal  casual, toe, heel and tandem gait.    Physical Exam  Constitutional:       Appearance: Normal appearance.   HENT:      Head: Normocephalic and atraumatic.   Eyes:      General: Lids are normal.      Extraocular Movements: Extraocular movements intact.      Pupils: Pupils are equal, round, and reactive to light.   Cardiovascular:      Rate and Rhythm: Normal rate.   Pulmonary:      Effort: Pulmonary effort is normal.   Musculoskeletal:         General: Normal range of motion.      Cervical back: Normal range of motion.   Skin:     General: Skin is warm and dry.   Neurological:      Mental Status: He is alert and oriented to person, place, and time. Mental status is at baseline.      Motor: Motor strength is normal.      Coordination: Coordination is intact.   Psychiatric:         Mood and Affect: Mood normal.         Speech: Speech normal.       Results Review:    I reviewed the patient's new clinical results.    Lab Results (last 24 hours)       Procedure Component Value Units Date/Time    Lipid Panel [[id number]]  (Abnormal) Collected: 07/19/23 0626    Specimen: Blood Updated: 07/19/23 1401     Total Cholesterol 118 mg/dL      Triglycerides 90 mg/dL      HDL Cholesterol 39 mg/dL      LDL Cholesterol  62 mg/dL      VLDL Cholesterol 17 mg/dL      LDL/HDL Ratio 1.56    Narrative:      Cholesterol Reference Ranges  ([place] Department of Health and Human Services ATP III Classifications)    Desirable          <200 mg/dL  Borderline High    200-239 mg/dL  High Risk          >240 mg/dL      Triglyceride Reference Ranges  ([place].S. Department of Health and Human Services ATP III Classifications)    Normal           <150 mg/dL  Borderline High  150-199 mg/dL  High             200-499 mg/dL  Very High        >500 mg/dL    HDL Reference Ranges  ([place].S. Department of Health and Human Services ATP III Classifications)    Low     <40 mg/dl (major risk factor for CHD)  High    >60 mg/dl ('negative' risk factor for CHD)        LDL  Reference Ranges  ([place].S. Department of Health and Human Services ATP III Classifications)    Optimal          <100 mg/dL  Near Optimal     100-129 mg/dL  Borderline High  130-159 mg/dL  High             160-189 mg/dL  Very High        >189 mg/dL    Hemoglobin A1c [[id number]]  (Normal) Collected: 07/19/23 0626    Specimen: Blood Updated: 07/19/23 1400     Hemoglobin A1C 5.60 %     Narrative:      Hemoglobin A1C Ranges:    Increased Risk for Diabetes  5.7% to 6.4%  Diabetes                     >= 6.5%  Diabetic Goal                < 7.0%    Basic Metabolic Panel [[id number]]  (Abnormal) Collected: 07/19/23 0626    Specimen: Blood Updated: 07/19/23 0707     Glucose 107 mg/dL      BUN 8 mg/dL      Creatinine 0.92 mg/dL      Sodium 140 mmol/L      Potassium 4.3 mmol/L      Chloride 106 mmol/L      CO2 27.0 mmol/L      Calcium 8.6 mg/dL      BUN/Creatinine Ratio 8.7     Anion Gap 7.0 mmol/L      eGFR 90.6 mL/min/1.73     Narrative:      GFR Normal >60  Chronic Kidney Disease <60  Kidney Failure <15      CBC & Differential [[id number]]  (Abnormal) Collected: 07/19/23 0626    Specimen: Blood Updated: 07/19/23 0646    Narrative:      The following orders were created for panel order CBC & Differential.  Procedure                               Abnormality         Status                     ---------                               -----------         ------                     CBC Auto Differential[[id number]]        Abnormal            Final result                 Please view results for these tests on the individual orders.    CBC Auto Differential [[id number]]  (Abnormal) Collected: 07/19/23 0626    Specimen: Blood Updated: 07/19/23 0646     WBC 5.50 10*3/mm3      RBC 4.26 10*6/mm3      Hemoglobin 13.9 g/dL      Hematocrit 41.0 %      MCV 96.2 fL      MCH 32.6 pg      MCHC 33.9 g/dL      RDW 11.8 %      RDW-SD 41.0 fl      MPV 9.6 fL      Platelets 152 10*3/mm3      Neutrophil % 50.2 %      Lymphocyte % 31.5 %      Monocyte %  12.7 %      Eosinophil % 4.2 %      Basophil % 0.9 %      Immature Grans % 0.5 %      Neutrophils, Absolute 2.76 10*3/mm3      Lymphocytes, Absolute 1.73 10*3/mm3      Monocytes, Absolute 0.70 10*3/mm3      Eosinophils, Absolute 0.23 10*3/mm3      Basophils, Absolute 0.05 10*3/mm3      Immature Grans, Absolute 0.03 10*3/mm3      nRBC 0.0 /100 WBC     High Sensitivity Troponin T 2Hr [[id number]]  (Normal) Collected: 07/18/23 2153    Specimen: Blood Updated: 07/18/23 2219     HS Troponin T 9 ng/L      Troponin T Delta 2 ng/L     Narrative:      High Sensitive Troponin T Reference Range:  <10.0 ng/L- Negative Female for AMI  <15.0 ng/L- Negative Male for AMI  >=10 - Abnormal Female indicating possible myocardial injury.  >=15 - Abnormal Male indicating possible myocardial injury.   Clinicians would have to utilize clinical acumen, EKG, Troponin, and serial changes to determine if it is an Acute Myocardial Infarction or myocardial injury due to an underlying chronic condition.         Extra Tubes [[id number]] Collected: 07/18/23 1859    Specimen: Blood, Venous Line Updated: 07/18/23 2000    Narrative:      The following orders were created for panel order Extra Tubes.  Procedure                               Abnormality         Status                     ---------                               -----------         ------                     Gold Top - SST[[id number]]                                   Final result                 Please view results for these tests on the individual orders.    Gold Top - SST [[id number]] Collected: 07/18/23 1859    Specimen: Blood Updated: 07/18/23 2000     Extra Tube Hold for add-ons.     Comment: Auto resulted.       Comprehensive Metabolic Panel [[id number]]  (Abnormal) Collected: 07/18/23 1859    Specimen: Blood Updated: 07/18/23 1947     Glucose 103 mg/dL      BUN 9 mg/dL      Creatinine 1.06 mg/dL      Sodium 140 mmol/L      Potassium 4.7 mmol/L      Comment: Slight hemolysis detected by  analyzer. Results may be affected.        Chloride 102 mmol/L      CO2 28.0 mmol/L      Calcium 9.3 mg/dL      Total Protein 7.3 g/dL      Albumin 4.3 g/dL      ALT (SGPT) 20 U/L      AST (SGOT) 18 U/L      Comment: Slight hemolysis detected by analyzer. Results may be affected.        Alkaline Phosphatase 75 U/L      Total Bilirubin 0.4 mg/dL      Globulin 3.0 gm/dL      A/G Ratio 1.4 g/dL      BUN/Creatinine Ratio 8.5     Anion Gap 10.0 mmol/L      eGFR 76.4 mL/min/1.73     Narrative:      GFR Normal >60  Chronic Kidney Disease <60  Kidney Failure <15      High Sensitivity Troponin T [[id number]]  (Normal) Collected: 07/18/23 1859    Specimen: Blood Updated: 07/18/23 1947     HS Troponin T 7 ng/L     Narrative:      High Sensitive Troponin T Reference Range:  <10.0 ng/L- Negative Female for AMI  <15.0 ng/L- Negative Male for AMI  >=10 - Abnormal Female indicating possible myocardial injury.  >=15 - Abnormal Male indicating possible myocardial injury.   Clinicians would have to utilize clinical acumen, EKG, Troponin, and serial changes to determine if it is an Acute Myocardial Infarction or myocardial injury due to an underlying chronic condition.         aPTT [[id number]]  (Normal) Collected: 07/18/23 1859    Specimen: Blood Updated: 07/18/23 1929     PTT 22.0 seconds     Narrative:      The recommended Heparin therapeutic range is 68-97 seconds.    Protime-INR [[id number]]  (Normal) Collected: 07/18/23 1859    Specimen: Blood Updated: 07/18/23 1929     Protime 12.7 Seconds      INR 0.95    Narrative:      Therapeutic range for most indications is 2.0-3.0 INR,  or 2.5-3.5 for mechanical heart valves.    CBC & Differential [[id number]]  (Abnormal) Collected: 07/18/23 1859    Specimen: Blood Updated: 07/18/23 1913    Narrative:      The following orders were created for panel order CBC & Differential.  Procedure                               Abnormality         Status                     ---------                                -----------         ------                     CBC Auto Differential[[id number]]        Abnormal            Final result                 Please view results for these tests on the individual orders.    CBC Auto Differential [[id number]]  (Abnormal) Collected: 07/18/23 1859    Specimen: Blood Updated: 07/18/23 1913     WBC 6.59 10*3/mm3      RBC 4.29 10*6/mm3      Hemoglobin 14.7 g/dL      Hematocrit 41.8 %      MCV 97.4 fL      MCH 34.3 pg      MCHC 35.2 g/dL      RDW 11.8 %      RDW-SD 42.0 fl      MPV 10.1 fL      Platelets 190 10*3/mm3      Neutrophil % 46.6 %      Lymphocyte % 34.0 %      Monocyte % 12.7 %      Eosinophil % 5.2 %      Basophil % 1.2 %      Immature Grans % 0.3 %      Neutrophils, Absolute 3.07 10*3/mm3      Lymphocytes, Absolute 2.24 10*3/mm3      Monocytes, Absolute 0.84 10*3/mm3      Eosinophils, Absolute 0.34 10*3/mm3      Basophils, Absolute 0.08 10*3/mm3      Immature Grans, Absolute 0.02 10*3/mm3      nRBC 0.0 /100 WBC     POC Glucose Once [[id number]]  (Normal) Collected: 07/18/23 1824    Specimen: Blood Updated: 07/18/23 1836     Glucose 96 mg/dL      Comment: : [id number] FIGHT MADISONMeter ID: [id number]             CT Head Without Contrast    Result Date: 7/18/2023  No acute intracranial process.     MRI Angiogram Head Without Contrast    Result Date: 7/19/2023  No proximal large vessel occlusion or flow-limiting stenosis.     MRI Angiogram Neck Without Contrast    Result Date: 7/19/2023  1.  Somewhat limited by motion and lack of intravenous contrast media. 2.  No significant stenosis involving either internal carotid artery.  Mild signal loss involving the proximal right ICA is felt to be related artifact rather than significant stenosis.     MRI Brain Without Contrast    Result Date: 7/19/2023  No acute intracranial abnormality. Mild microangiopathy.    Results for orders placed during the hospital encounter of 06/27/19    Transthoracic Echo Complete With Contrast if  Necessary Per Protocol    Interpretation Summary  · The study is technically difficult for diagnosis.  · The left ventricular cavity is borderline dilated.  · Estimated EF = 58%.  · Left ventricular systolic function is normal.  · Right ventricular cavity is borderline dilated. Normal systolic function  · Left atrial cavity size is moderately dilated.  · RVSP 38 mm hg        Assessment/Plan     Assessment/Plan: Pt is a 68-yr-old right-handed white male with known diagnoses of CABG who presented after sudden onset of left arm decreased sensation, dizziness, and ringing in both ears. Pt states symptoms lasted around 45 minutes. Today he states being back to his baseline. Strengths are equal 5/5, denies numbness, and visual field is intact.   Left arm numbness- Resolved, Possible TIA. MRI and MRAs neg for stroke or vessel occlusion or stenosis. Echo is pending. Continue aspirin 81 mg, load Plavix 300 mg today then 75 mg/day and statin for secondary prevention. After 21 days stop Plavix and continue aspirin 81 mg/day. Cardiac monitor ordered for after discharge to assess for PAF. If neg, recommend a loop recorder to be placed.   Normal BP goals. Instructed on stroke risk factors, prevention, and s/s to call 911. Pt and wife verbalized understanding.   Case was discussed with pt, pt's wife, Dr. Weathers, and nursing. Neurology will sign off.             TIA (transient ischemic attack)          MICHELLE García  07/19/23  15:08 CDT        I spent 40 minutes caring for Lawrence Danielson  on this date of service. This time includes time spent by me in the following activities: preparing for the visit, reviewing tests, obtaining and/or reviewing a separately obtained history, performing a medically appropriate examination and/or evaluation, counseling and educating the patient/family/caregiver, ordering medications, tests, or procedures, referring and communicating with other health care professionals, documenting  information in the medical record, independently interpreting results and communicating that information with the patient/family/caregiver, and care coordination

## 2023-07-19 NOTE — DISCHARGE SUMMARY
[facility] Medicine Services  DISCHARGE SUMMARY       Date of Admission: 7/18/2023  Date of Discharge:  7/19/2023  Primary Care Physician: Provider, No Known    Presenting Problem/History of Present Illness:  TIA (transient ischemic attack) [G45.9]       Final Discharge Diagnoses:  Active Hospital Problems    Diagnosis     **TIA (transient ischemic attack)        Consults:   Consults       Date and Time Order Name Status Description    7/18/2023  7:04 PM Hospitalist (on-call MD unless specified)      7/18/2023  7:04 PM Inpatient Neurology Consult Stroke Completed             Pertinent Test Results:   Lab Results (most recent)       Procedure Component Value Units Date/Time    Lipid Panel [[id number]]  (Abnormal) Collected: 07/19/23 0626    Specimen: Blood Updated: 07/19/23 1401     Total Cholesterol 118 mg/dL      Triglycerides 90 mg/dL      HDL Cholesterol 39 mg/dL      LDL Cholesterol  62 mg/dL      VLDL Cholesterol 17 mg/dL      LDL/HDL Ratio 1.56    Narrative:      Cholesterol Reference Ranges  (U.S. Department of Health and Human Services ATP III Classifications)    Desirable          <200 mg/dL  Borderline High    200-239 mg/dL  High Risk          >240 mg/dL      Triglyceride Reference Ranges  (U.S. Department of Health and Human Services ATP III Classifications)    Normal           <150 mg/dL  Borderline High  150-199 mg/dL  High             200-499 mg/dL  Very High        >500 mg/dL    HDL Reference Ranges  (U.S. Department of Health and Human Services ATP III Classifications)    Low     <40 mg/dl (major risk factor for CHD)  High    >60 mg/dl ('negative' risk factor for CHD)        LDL Reference Ranges  (U.S. Department of Health and Human Services ATP III Classifications)    Optimal          <100 mg/dL  Near Optimal     100-129 mg/dL  Borderline High  130-159 mg/dL  High             160-189 mg/dL  Very High        >189 mg/dL    Hemoglobin A1c [[id number]]   (Normal) Collected: 07/19/23 0626    Specimen: Blood Updated: 07/19/23 1400     Hemoglobin A1C 5.60 %     Narrative:      Hemoglobin A1C Ranges:    Increased Risk for Diabetes  5.7% to 6.4%  Diabetes                     >= 6.5%  Diabetic Goal                < 7.0%    Basic Metabolic Panel [[id number]]  (Abnormal) Collected: 07/19/23 0626    Specimen: Blood Updated: 07/19/23 0707     Glucose 107 mg/dL      BUN 8 mg/dL      Creatinine 0.92 mg/dL      Sodium 140 mmol/L      Potassium 4.3 mmol/L      Chloride 106 mmol/L      CO2 27.0 mmol/L      Calcium 8.6 mg/dL      BUN/Creatinine Ratio 8.7     Anion Gap 7.0 mmol/L      eGFR 90.6 mL/min/1.73     Narrative:      GFR Normal >60  Chronic Kidney Disease <60  Kidney Failure <15      CBC & Differential [[id number]]  (Abnormal) Collected: 07/19/23 0626    Specimen: Blood Updated: 07/19/23 0646    Narrative:      The following orders were created for panel order CBC & Differential.  Procedure                               Abnormality         Status                     ---------                               -----------         ------                     CBC Auto Differential[[id number]]        Abnormal            Final result                 Please view results for these tests on the individual orders.    CBC Auto Differential [[id number]]  (Abnormal) Collected: 07/19/23 0626    Specimen: Blood Updated: 07/19/23 0646     WBC 5.50 10*3/mm3      RBC 4.26 10*6/mm3      Hemoglobin 13.9 g/dL      Hematocrit 41.0 %      MCV 96.2 fL      MCH 32.6 pg      MCHC 33.9 g/dL      RDW 11.8 %      RDW-SD 41.0 fl      MPV 9.6 fL      Platelets 152 10*3/mm3      Neutrophil % 50.2 %      Lymphocyte % 31.5 %      Monocyte % 12.7 %      Eosinophil % 4.2 %      Basophil % 0.9 %      Immature Grans % 0.5 %      Neutrophils, Absolute 2.76 10*3/mm3      Lymphocytes, Absolute 1.73 10*3/mm3      Monocytes, Absolute 0.70 10*3/mm3      Eosinophils, Absolute 0.23 10*3/mm3      Basophils, Absolute 0.05  10*3/mm3      Immature Grans, Absolute 0.03 10*3/mm3      nRBC 0.0 /100 WBC     High Sensitivity Troponin T 2Hr [[id number]]  (Normal) Collected: 07/18/23 2153    Specimen: Blood Updated: 07/18/23 2219     HS Troponin T 9 ng/L      Troponin T Delta 2 ng/L     Narrative:      High Sensitive Troponin T Reference Range:  <10.0 ng/L- Negative Female for AMI  <15.0 ng/L- Negative Male for AMI  >=10 - Abnormal Female indicating possible myocardial injury.  >=15 - Abnormal Male indicating possible myocardial injury.   Clinicians would have to utilize clinical acumen, EKG, Troponin, and serial changes to determine if it is an Acute Myocardial Infarction or myocardial injury due to an underlying chronic condition.         Extra Tubes [[id number]] Collected: 07/18/23 1859    Specimen: Blood, Venous Line Updated: 07/18/23 2000    Narrative:      The following orders were created for panel order Extra Tubes.  Procedure                               Abnormality         Status                     ---------                               -----------         ------                     Gold Top - SST[[id number]]                                   Final result                 Please view results for these tests on the individual orders.    Gold Top - SST [[id number]] Collected: 07/18/23 1859    Specimen: Blood Updated: 07/18/23 2000     Extra Tube Hold for add-ons.     Comment: Auto resulted.       Comprehensive Metabolic Panel [[id number]]  (Abnormal) Collected: 07/18/23 1859    Specimen: Blood Updated: 07/18/23 1947     Glucose 103 mg/dL      BUN 9 mg/dL      Creatinine 1.06 mg/dL      Sodium 140 mmol/L      Potassium 4.7 mmol/L      Comment: Slight hemolysis detected by analyzer. Results may be affected.        Chloride 102 mmol/L      CO2 28.0 mmol/L      Calcium 9.3 mg/dL      Total Protein 7.3 g/dL      Albumin 4.3 g/dL      ALT (SGPT) 20 U/L      AST (SGOT) 18 U/L      Comment: Slight hemolysis detected by analyzer. Results may be  affected.        Alkaline Phosphatase 75 U/L      Total Bilirubin 0.4 mg/dL      Globulin 3.0 gm/dL      A/G Ratio 1.4 g/dL      BUN/Creatinine Ratio 8.5     Anion Gap 10.0 mmol/L      eGFR 76.4 mL/min/1.73     Narrative:      GFR Normal >60  Chronic Kidney Disease <60  Kidney Failure <15      High Sensitivity Troponin T [[id number]]  (Normal) Collected: 07/18/23 1859    Specimen: Blood Updated: 07/18/23 1947     HS Troponin T 7 ng/L     Narrative:      High Sensitive Troponin T Reference Range:  <10.0 ng/L- Negative Female for AMI  <15.0 ng/L- Negative Male for AMI  >=10 - Abnormal Female indicating possible myocardial injury.  >=15 - Abnormal Male indicating possible myocardial injury.   Clinicians would have to utilize clinical acumen, EKG, Troponin, and serial changes to determine if it is an Acute Myocardial Infarction or myocardial injury due to an underlying chronic condition.         aPTT [[id number]]  (Normal) Collected: 07/18/23 1859    Specimen: Blood Updated: 07/18/23 1929     PTT 22.0 seconds     Narrative:      The recommended Heparin therapeutic range is 68-97 seconds.    Protime-INR [[id number]]  (Normal) Collected: 07/18/23 1859    Specimen: Blood Updated: 07/18/23 1929     Protime 12.7 Seconds      INR 0.95    Narrative:      Therapeutic range for most indications is 2.0-3.0 INR,  or 2.5-3.5 for mechanical heart valves.    CBC & Differential [[id number]]  (Abnormal) Collected: 07/18/23 1859    Specimen: Blood Updated: 07/18/23 1913    Narrative:      The following orders were created for panel order CBC & Differential.  Procedure                               Abnormality         Status                     ---------                               -----------         ------                     CBC Auto Differential[[id number]]        Abnormal            Final result                 Please view results for these tests on the individual orders.    CBC Auto Differential [[id number]]  (Abnormal) Collected:  07/18/23 1859    Specimen: Blood Updated: 07/18/23 1913     WBC 6.59 10*3/mm3      RBC 4.29 10*6/mm3      Hemoglobin 14.7 g/dL      Hematocrit 41.8 %      MCV 97.4 fL      MCH 34.3 pg      MCHC 35.2 g/dL      RDW 11.8 %      RDW-SD 42.0 fl      MPV 10.1 fL      Platelets 190 10*3/mm3      Neutrophil % 46.6 %      Lymphocyte % 34.0 %      Monocyte % 12.7 %      Eosinophil % 5.2 %      Basophil % 1.2 %      Immature Grans % 0.3 %      Neutrophils, Absolute 3.07 10*3/mm3      Lymphocytes, Absolute 2.24 10*3/mm3      Monocytes, Absolute 0.84 10*3/mm3      Eosinophils, Absolute 0.34 10*3/mm3      Basophils, Absolute 0.08 10*3/mm3      Immature Grans, Absolute 0.02 10*3/mm3      nRBC 0.0 /100 WBC     POC Glucose Once [[id number]]  (Normal) Collected: 07/18/23 1824    Specimen: Blood Updated: 07/18/23 1836     Glucose 96 mg/dL      Comment: : [id number] FIGHT MADISONMeter ID: [id number]             Imaging Results (Most Recent)       Procedure Component Value Units Date/Time    MRI Angiogram Neck Without Contrast [[id number]] Collected: 07/19/23 0914     Updated: 07/19/23 1156    Narrative:      COMPARISON:  No comparison.    HISTORY:  Neurological deficit, acute stroke suspected.    TECHNIQUE:  Multiplanar, multisequence images were obtained through the neck.  No IV  contrast was administered.  Multiple 3-D reconstructions were performed.    FINDINGS:  There is mild signal loss involving the proximal right ICA, although this is  felt to represent artifact.  Otherwise, grossly normal signal within the  internal carotid arteries and visualized portions of the common carotid  arteries.  The origins of the vertebral arteries are not well seen due to  artifact.  The remainder of the vertebral arteries are unremarkable with the  left vertebral artery being dominant.      Impression:      1.  Somewhat limited by motion and lack of intravenous contrast media.    2.  No significant stenosis involving either internal  carotid artery.  Mild  signal loss involving the proximal right ICA is felt to be related artifact  rather than significant stenosis.      MRI Brain Without Contrast [[id number]] Collected: 07/19/23 0835     Updated: 07/19/23 0836    Narrative:      INDICATION:  Neuro deficit, acute, stroke suspected.    COMPARISON:  None relevant.    TECHNIQUE:  Multisequence, multiplanar MRI of the brain was performed without intravenous  contrast.    FINDINGS:  No acute infarction, hemorrhage, extra-axial fluid collection, hydrocephalus, or  mass.  Mild microangiopathy.  Mucosal thickening throughout the paranasal  sinuses, most significant and moderately involving the ethmoid air cells.      Impression:      No acute intracranial abnormality.    Mild microangiopathy.          MRI Angiogram Head Without Contrast [[id number]] Collected: 07/19/23 0824     Updated: 07/19/23 0824    Narrative:      INDICATION:  Neuro deficit, acute, stroke suspected.    COMPARISON:  None relevant.    TECHNIQUE:  3-D time-of-flight acquisition was performed through the [place].  3-D  rotating MIPS were provided.  Intravenous contrast was not administered.    Measurements are based on NASCET criteria, calculated from the ratio of the  linear luminal diameter of the narrowest segment of the diseased portion of the  artery to the diameter of the artery beyond any poststenotic dilatation.    Stenoses are graded as follows: Mild = <50%; moderate = 50-69%; severe 70-99%    FINDINGS:  No proximal large vessel occlusion or flow-limiting stenosis.  No discernible  aneurysm or evidence of dissection.      Impression:      No proximal large vessel occlusion or flow-limiting stenosis.        XR Chest 1 View [[id number]] Collected: 07/18/23 1844     Updated: 07/18/23 2004    Narrative:      INDICATION:  cva.    COMPARISON:  None relevant.    FINDINGS:  Heart size, lungs, and pleural spaces are within normal limits.    Sternotomy wires are present.      CT  "Head Without Contrast [[id number]] Collected: 07/18/23 1845     Updated: 07/18/23 1950    Narrative:      INDICATION:  cva.    COMPARISON:  None relevant.    TECHNIQUE:  Axial images were performed from the calvarium through the skull base followed  by 2D multiplanar reformats.  No contrast was administered.    FINDINGS:  No acute cortical infarction, hemorrhage, extra-axial collection, hydrocephalus,  or mass lesion/mass effect.  Sinuses and mastoid air cells are clear.      Impression:      No acute intracranial process.                    Chief Complaint on Day of Discharge: None    Hospital Course:  The patient is a 68 y.o. male who presented to [facility] with left arm weakness and decreased sensation.  Symptoms resolved after approximately 45 minutes.  Neurology was consulted from the emergency department full stroke work-up was undertaken.  Work-up was negative for acute stroke.  Patient stayed symptom-free.  He did state that approximately 9 months ago his watch alerted him to potential episode of atrial fibrillation.  He will have a cardiac monitor placed for outpatient monitoring at discharge.  He will follow-up with his primary care provider in 1 to 2 weeks.    Condition on Discharge: Hemodynamically stable    Physical Exam on Discharge:  /68 (BP Location: Left arm, Patient Position: Lying)   Pulse 72   Temp 98.6 °F (37 °C) (Temporal)   Resp 16   Ht 175.3 cm (69\")   Wt 89.3 kg (196 lb 12.8 oz)   SpO2 92%   BMI 29.06 kg/m²   Physical Exam  Vitals reviewed.   Constitutional:       Appearance: He is well-developed.   HENT:      Head: Normocephalic and atraumatic.   Eyes:      Pupils: Pupils are equal, round, and reactive to light.   Cardiovascular:      Rate and Rhythm: Normal rate and regular rhythm.      Heart sounds: Normal heart sounds. No murmur heard.    No friction rub. No gallop.   Pulmonary:      Effort: Pulmonary effort is normal. No respiratory distress.      Breath " sounds: Normal breath sounds. No wheezing or rales.   Chest:      Chest wall: No tenderness.   Abdominal:      General: Bowel sounds are normal. There is no distension.      Palpations: Abdomen is soft.      Tenderness: There is no abdominal tenderness.   Musculoskeletal:      Cervical back: Normal range of motion and neck supple.   Psychiatric:         Behavior: Behavior normal.         Discharge Disposition:  Home or Self Care    Discharge Medications:     Discharge Medications        New Medications        Instructions Start Date   clopidogrel 75 MG tablet  Commonly known as: PLAVIX   75 mg, Oral, Daily   Start Date: July 20, 2023            Continue These Medications        Instructions Start Date   aspirin 81 MG EC tablet   81 mg, Oral, Daily      folic acid 400 MCG tablet  Commonly known as: FOLVITE   400 mcg, Oral, Daily      levothyroxine 25 MCG tablet  Commonly known as: SYNTHROID, LEVOTHROID   50 mcg, Oral, Daily      metoprolol tartrate 25 MG tablet  Commonly known as: LOPRESSOR   12.5 mg, Oral, 2 Times Daily      pantoprazole 40 MG EC tablet  Commonly known as: PROTONIX   40 mg, Oral, Daily      ranolazine 500 MG 12 hr tablet  Commonly known as: RANEXA   1,000 mg, Oral, 2 Times Daily      rosuvastatin 10 MG tablet  Commonly known as: CRESTOR   40 mg, Oral, Daily      vitamin B-12 100 MCG tablet  Commonly known as: CYANOCOBALAMIN   50 mcg, Oral, Daily               Discharge Diet:   Diet Instructions       Diet: Cardiac Diets; Healthy Heart (2-3 Na+); Regular Texture (IDDSI 7); Thin (IDDSI 0)      Discharge Diet: Cardiac Diets    Cardiac Diet: Healthy Heart (2-3 Na+)    Texture: Regular Texture (IDDSI 7)    Fluid Consistency: Thin (IDDSI 0)            Activity at Discharge:   Activity Instructions       Activity as Tolerated              Follow-up Appointments:   PCP in 1 to 2 weeks  Cardiac monitor to assess for A-fib            This document has been electronically signed by Juan Melara MD on  July 19, 2023 16:44 CDT      Time: 35 minutes

## 2023-08-02 ENCOUNTER — READMISSION MANAGEMENT (OUTPATIENT)
Dept: CALL CENTER | Facility: HOSPITAL | Age: 68
End: 2023-08-02
Payer: MEDICARE

## 2023-08-10 ENCOUNTER — READMISSION MANAGEMENT (OUTPATIENT)
Dept: CALL CENTER | Facility: HOSPITAL | Age: 68
End: 2023-08-10
Payer: MEDICARE

## 2023-08-10 NOTE — OUTREACH NOTE
Stroke Week 3 Survey      Flowsheet Row Responses   [facility] facility patient discharged from? [place]   Does the patient have one of the following disease processes/diagnoses(primary or secondary)? Stroke   Week 3 attempt successful? No   Unsuccessful attempts Attempt 1   Discharge diagnosis TIA            SAMSON LEHMAN - Registered Nurse

## 2023-08-17 ENCOUNTER — READMISSION MANAGEMENT (OUTPATIENT)
Dept: CALL CENTER | Facility: HOSPITAL | Age: 68
End: 2023-08-17
Payer: MEDICARE

## 2023-08-17 NOTE — OUTREACH NOTE
Stroke Week 3 Survey      Flowsheet Row Responses   [facility] facility patient discharged from? [place]   Does the patient have one of the following disease processes/diagnoses(primary or secondary)? Stroke   Week 3 attempt successful? No   Unsuccessful attempts Attempt 2   Discharge diagnosis TIA            SAMSON LEHMAN - Registered Nurse

## 2023-09-14 ENCOUNTER — NEW PATIENT (OUTPATIENT)
Dept: URBAN - METROPOLITAN AREA CLINIC 47 | Facility: CLINIC | Age: 68
End: 2023-09-14

## 2023-09-14 DIAGNOSIS — H02.88A: ICD-10-CM

## 2023-09-14 DIAGNOSIS — H25.13: ICD-10-CM

## 2023-09-14 DIAGNOSIS — H52.7: ICD-10-CM

## 2023-09-14 PROCEDURE — 92004 COMPRE OPH EXAM NEW PT 1/>: CPT

## 2023-09-14 PROCEDURE — 92015 DETERMINE REFRACTIVE STATE: CPT

## 2023-09-14 ASSESSMENT — TONOMETRY
OS_IOP_MMHG: 16
OD_IOP_MMHG: 16

## 2023-09-14 ASSESSMENT — VISUAL ACUITY
OD_SC: J8
OS_SC: 20/40
OD_SC: 20/30
OU_SC: 20/20-1
OS_SC: J3
OU_SC: J4

## 2023-09-28 ENCOUNTER — EMERGENCY VISIT (OUTPATIENT)
Dept: URBAN - METROPOLITAN AREA CLINIC 47 | Facility: CLINIC | Age: 68
End: 2023-09-28

## 2023-09-28 DIAGNOSIS — H02.88A: ICD-10-CM

## 2023-09-28 DIAGNOSIS — G45.9: ICD-10-CM

## 2023-09-28 DIAGNOSIS — H02.88B: ICD-10-CM

## 2023-09-28 DIAGNOSIS — H43.392: ICD-10-CM

## 2023-09-28 DIAGNOSIS — H25.13: ICD-10-CM

## 2023-09-28 PROCEDURE — 99213 OFFICE O/P EST LOW 20 MIN: CPT

## 2023-09-28 PROCEDURE — 92134 CPTRZ OPH DX IMG PST SGM RTA: CPT

## 2023-09-28 ASSESSMENT — VISUAL ACUITY
OS_SC: 20/20-1
OD_PH: 20/20
OD_SC: 20/30-1

## 2023-09-28 ASSESSMENT — TONOMETRY
OS_IOP_MMHG: 14
OD_IOP_MMHG: 14

## 2023-10-05 ENCOUNTER — FOLLOW UP (OUTPATIENT)
Dept: URBAN - METROPOLITAN AREA CLINIC 47 | Facility: CLINIC | Age: 68
End: 2023-10-05

## 2023-10-05 DIAGNOSIS — G45.9: ICD-10-CM

## 2023-10-05 DIAGNOSIS — H53.10: ICD-10-CM

## 2023-10-05 PROCEDURE — 99211T TECH SERVICE

## 2023-10-05 PROCEDURE — 92083 EXTENDED VISUAL FIELD XM: CPT

## 2023-11-21 ENCOUNTER — EMERGENCY VISIT (OUTPATIENT)
Dept: URBAN - METROPOLITAN AREA CLINIC 47 | Facility: CLINIC | Age: 68
End: 2023-11-21

## 2023-11-21 DIAGNOSIS — H25.13: ICD-10-CM

## 2023-11-21 DIAGNOSIS — H02.88A: ICD-10-CM

## 2023-11-21 DIAGNOSIS — H53.10: ICD-10-CM

## 2023-11-21 DIAGNOSIS — G45.9: ICD-10-CM

## 2023-11-21 DIAGNOSIS — H02.88B: ICD-10-CM

## 2023-11-21 PROCEDURE — 99213 OFFICE O/P EST LOW 20 MIN: CPT

## 2023-11-21 ASSESSMENT — VISUAL ACUITY
OD_CC: 20/20
OS_CC: 20/40-
OD_SC: 20/30-1
OS_SC: 20/50-

## 2023-11-21 ASSESSMENT — TONOMETRY
OS_IOP_MMHG: 14
OD_IOP_MMHG: 16

## 2024-10-01 ENCOUNTER — COMPREHENSIVE EXAM (OUTPATIENT)
Dept: URBAN - METROPOLITAN AREA CLINIC 47 | Facility: CLINIC | Age: 69
End: 2024-10-01

## 2024-10-01 DIAGNOSIS — H02.88B: ICD-10-CM

## 2024-10-01 DIAGNOSIS — G43.109: ICD-10-CM

## 2024-10-01 DIAGNOSIS — H25.813: ICD-10-CM

## 2024-10-01 DIAGNOSIS — H02.88A: ICD-10-CM

## 2024-10-01 PROCEDURE — 92015 DETERMINE REFRACTIVE STATE: CPT

## 2024-10-01 PROCEDURE — 99214 OFFICE O/P EST MOD 30 MIN: CPT

## 2024-10-30 ENCOUNTER — CONSULTATION/EVALUATION (OUTPATIENT)
Dept: URBAN - METROPOLITAN AREA CLINIC 43 | Facility: CLINIC | Age: 69
End: 2024-10-30

## 2024-10-30 DIAGNOSIS — H04.123: ICD-10-CM

## 2024-10-30 DIAGNOSIS — H43.393: ICD-10-CM

## 2024-10-30 DIAGNOSIS — H25.813: ICD-10-CM

## 2024-10-30 PROCEDURE — 92025-1 CORNEAL TOPOGRAPHY, INS

## 2024-10-30 PROCEDURE — 92134 CPTRZ OPH DX IMG PST SGM RTA: CPT | Mod: NC

## 2024-10-30 PROCEDURE — 92136 OPHTHALMIC BIOMETRY: CPT

## 2024-10-30 PROCEDURE — 99214 OFFICE O/P EST MOD 30 MIN: CPT

## 2024-10-30 PROCEDURE — 92286 ANT SGM IMG I&R SPECLR MIC: CPT | Mod: NC

## 2024-10-30 RX ORDER — MOXIFLOXACIN OPHTHALMIC 5 MG/ML: 1 SOLUTION/ DROPS OPHTHALMIC

## 2024-10-30 RX ORDER — KETOROLAC TROMETHAMINE 5 MG/ML: 1 SOLUTION OPHTHALMIC

## 2024-10-30 RX ORDER — PREDNISOLONE ACETATE 10 MG/ML: 1 SUSPENSION/ DROPS OPHTHALMIC

## 2024-11-07 ENCOUNTER — SURGERY/PROCEDURE (OUTPATIENT)
Facility: LOCATION | Age: 69
End: 2024-11-07

## 2024-11-07 ENCOUNTER — PRE-OP/H&P (OUTPATIENT)
Facility: LOCATION | Age: 69
End: 2024-11-07

## 2024-11-07 DIAGNOSIS — H04.123: ICD-10-CM

## 2024-11-07 DIAGNOSIS — H25.813: ICD-10-CM

## 2024-11-07 DIAGNOSIS — H25.812: ICD-10-CM

## 2024-11-07 DIAGNOSIS — H43.393: ICD-10-CM

## 2024-11-07 PROCEDURE — 65772LRI LRI DURING CAT SX

## 2024-11-07 PROCEDURE — 99199PAV PROF ADVANCED VISION PACKAGE

## 2024-11-07 PROCEDURE — 66984AV REMOVE CATARACT, INSERT ADVANCED LENS

## 2024-11-07 PROCEDURE — 99211HP PRE-OP

## 2024-11-07 PROCEDURE — 66999LNSR LENSAR LASER FOR CAT SX

## 2024-11-08 ENCOUNTER — POST-OP (OUTPATIENT)
Dept: URBAN - METROPOLITAN AREA CLINIC 43 | Facility: CLINIC | Age: 69
End: 2024-11-08

## 2024-11-08 DIAGNOSIS — Z96.1: ICD-10-CM

## 2024-11-08 PROCEDURE — P6698455 NON-COMANAGED ADVANCED PO

## 2024-11-14 ENCOUNTER — SURGERY/PROCEDURE (OUTPATIENT)
Facility: LOCATION | Age: 69
End: 2024-11-14

## 2024-11-14 ENCOUNTER — PRE-OP/H&P (OUTPATIENT)
Dept: URBAN - METROPOLITAN AREA CLINIC 39 | Facility: CLINIC | Age: 69
End: 2024-11-14

## 2024-11-14 DIAGNOSIS — H25.811: ICD-10-CM

## 2024-11-14 DIAGNOSIS — H25.812: ICD-10-CM

## 2024-11-14 DIAGNOSIS — H43.393: ICD-10-CM

## 2024-11-14 DIAGNOSIS — H04.123: ICD-10-CM

## 2024-11-14 DIAGNOSIS — Z96.1: ICD-10-CM

## 2024-11-14 PROCEDURE — 99199PAV PROF ADVANCED VISION PACKAGE

## 2024-11-14 PROCEDURE — 65772LRI LRI DURING CAT SX

## 2024-11-14 PROCEDURE — 66984AV REMOVE CATARACT, INSERT ADVANCED LENS: Mod: 79,RT

## 2024-11-14 PROCEDURE — 66999LNSR LENSAR LASER FOR CAT SX

## 2024-11-14 PROCEDURE — 99211HP PRE-OP

## 2024-11-15 ENCOUNTER — POST-OP (OUTPATIENT)
Dept: URBAN - METROPOLITAN AREA CLINIC 47 | Facility: CLINIC | Age: 69
End: 2024-11-15

## 2024-11-15 DIAGNOSIS — Z96.1: ICD-10-CM

## 2024-11-15 PROCEDURE — 99199RSP RESIDENT SUPERVISED BY PROVIDER

## 2024-11-21 ENCOUNTER — POST-OP (OUTPATIENT)
Dept: URBAN - METROPOLITAN AREA CLINIC 47 | Facility: CLINIC | Age: 69
End: 2024-11-21

## 2024-11-21 DIAGNOSIS — Z96.1: ICD-10-CM

## 2024-11-21 PROCEDURE — 99024 POSTOP FOLLOW-UP VISIT: CPT

## 2024-12-12 ENCOUNTER — POST-OP (OUTPATIENT)
Age: 69
End: 2024-12-12

## 2024-12-12 DIAGNOSIS — Z96.1: ICD-10-CM

## 2024-12-12 PROCEDURE — 99024 POSTOP FOLLOW-UP VISIT: CPT

## 2025-03-07 ENCOUNTER — FOLLOW UP (OUTPATIENT)
Age: 70
End: 2025-03-07

## 2025-03-07 DIAGNOSIS — Z96.1: ICD-10-CM

## 2025-03-07 DIAGNOSIS — H26.493: ICD-10-CM

## 2025-03-07 PROCEDURE — 99213 OFFICE O/P EST LOW 20 MIN: CPT

## 2025-04-03 ENCOUNTER — CONSULTATION/EVALUATION (OUTPATIENT)
Age: 70
End: 2025-04-03

## 2025-04-03 ENCOUNTER — SURGERY/PROCEDURE (OUTPATIENT)
Age: 70
End: 2025-04-03

## 2025-04-03 DIAGNOSIS — H26.493: ICD-10-CM

## 2025-04-03 PROCEDURE — 6682150 YAG CAPSULOTOMY: Mod: 50

## 2025-04-03 PROCEDURE — 99214 OFFICE O/P EST MOD 30 MIN: CPT | Mod: 57

## 2025-04-05 ENCOUNTER — EMERGENCY VISIT (OUTPATIENT)
Age: 70
End: 2025-04-05

## 2025-04-05 DIAGNOSIS — H43.392: ICD-10-CM

## 2025-04-05 PROCEDURE — 92250 FUNDUS PHOTOGRAPHY W/I&R: CPT | Mod: 24

## 2025-04-05 PROCEDURE — 92012 INTRM OPH EXAM EST PATIENT: CPT | Mod: 24

## 2025-05-09 ENCOUNTER — POST-OP (OUTPATIENT)
Age: 70
End: 2025-05-09

## 2025-05-09 DIAGNOSIS — Z98.890: ICD-10-CM

## 2025-05-09 DIAGNOSIS — H43.393: ICD-10-CM

## 2025-05-09 DIAGNOSIS — Z96.1: ICD-10-CM

## 2025-05-09 PROCEDURE — 99024 POSTOP FOLLOW-UP VISIT: CPT

## (undated) DEVICE — GW PERIPH GUIDERIGHT STD/J/TP PTFE/PCOAT SS 0.038IN 5X150CM

## (undated) DEVICE — CATH GUIDE LAUNCHER JR4.0 6F 100CM

## (undated) DEVICE — MODEL AT P65, P/N 701554-001KIT CONTENTS: HAND CONTROLLER, 3-WAY HIGH-PRESSURE STOPCOCK WITH ROTATING END AND PREMIUM HIGH-PRESSURE TUBING: Brand: ANGIOTOUCH® KIT

## (undated) DEVICE — KT INTRO MINISTICK MAX W/GW NITNL/TUNG ECHO 4F 21G 7CM

## (undated) DEVICE — Device

## (undated) DEVICE — INTRO SHEATH ART/FEM ENGAGE .038 6F12CM

## (undated) DEVICE — GW CHOICE PT J 300CM

## (undated) DEVICE — PK CATH LAB 60

## (undated) DEVICE — MODEL BT2000 P/N 700287-012KIT CONTENTS: MANIFOLD WITH SALINE AND CONTRAST PORTS, SALINE TUBING WITH SPIKE AND HAND SYRINGE, TRANSDUCER: Brand: BT2000 AUTOMATED MANIFOLD KIT

## (undated) DEVICE — CATH DIAG EXPO M/ PK 6FR FL4/FR4 PIG 3PK

## (undated) DEVICE — A2000 MULTI-USE SYRINGE KIT, P/N 701277-003KIT CONTENTS: 100ML CONTRAST RESERVOIR AND TUBING WITH CONTRAST SPIKE AND CLAMP: Brand: A2000 MULTI-USE SYRINGE KIT

## (undated) DEVICE — ELECTRODE,RT,STRESS,FOAM,50PK: Brand: MEDLINE

## (undated) DEVICE — MINI TREK™ II CORONARY DILATATION CATHETER 2.00 MM X 8 MM / OVER-THE-WIRE: Brand: MINI TREK™

## (undated) DEVICE — COPILOT KIT INCLUDES BLEEDBACK CONTROL VALVE / GUIDE WIRE INTRODUCER / TORQUE DEVICE: Brand: ACCESSORIES

## (undated) DEVICE — TREK CORONARY DILATATION CATHETER 2.50 MM X 20 MM / RAPID-EXCHANGE: Brand: TREK

## (undated) DEVICE — PERCLOSE PROGLIDE™ SUTURE-MEDIATED CLOSURE SYSTEM: Brand: PERCLOSE PROGLIDE™

## (undated) DEVICE — CATH GUIDE LAUNCHER LCB 6F 100CM

## (undated) DEVICE — NC TREK CORONARY DILATATION CATHETER 2.5 MM X 20 MM / RAPID-EXCHANGE: Brand: NC TREK

## (undated) DEVICE — DEV INFL MONARCH 20ML

## (undated) DEVICE — MINI TREK CORONARY DILATATION CATHETER 2.0 MM X 15 MM / RAPID-EXCHANGE: Brand: MINI TREK

## (undated) DEVICE — NC TREK CORONARY DILATATION CATHETER 3.0 MM X 15 MM / RAPID-EXCHANGE: Brand: NC TREK

## (undated) DEVICE — ANGIO-SEAL EVOLUTION VASCULAR CLOSURE DEVICE: Brand: ANGIO-SEAL

## (undated) DEVICE — NC TREK CORONARY DILATATION CATHETER 2.50 MM X 20 MM / OVER-THE-WIRE: Brand: NC TREK

## (undated) DEVICE — THE PRONTO CATHETER IS INDICATED FOR THE REMOVAL OF FRESH, SOFT EMBOLI AND THROMBI FROM VESSELS IN THE CORONARY AND PERIPHERAL VASCULATURE.: Brand: PRONTO® V4 EXTRACTION CATHETER

## (undated) DEVICE — RUNTHROUGH NS EXTRA FLOPPY PTCA GUIDEWIRE: Brand: RUNTHROUGH

## (undated) DEVICE — Device: Brand: FIELDER XT